# Patient Record
Sex: MALE | Race: WHITE | NOT HISPANIC OR LATINO
[De-identification: names, ages, dates, MRNs, and addresses within clinical notes are randomized per-mention and may not be internally consistent; named-entity substitution may affect disease eponyms.]

---

## 2018-05-23 ENCOUNTER — APPOINTMENT (OUTPATIENT)
Dept: UROLOGY | Facility: CLINIC | Age: 63
End: 2018-05-23
Payer: COMMERCIAL

## 2018-05-23 VITALS
HEIGHT: 75 IN | DIASTOLIC BLOOD PRESSURE: 80 MMHG | SYSTOLIC BLOOD PRESSURE: 117 MMHG | HEART RATE: 69 BPM | RESPIRATION RATE: 16 BRPM | TEMPERATURE: 97 F

## 2018-05-23 DIAGNOSIS — Z00.00 ENCOUNTER FOR GENERAL ADULT MEDICAL EXAMINATION W/OUT ABNORMAL FINDINGS: ICD-10-CM

## 2018-05-23 DIAGNOSIS — R35.0 FREQUENCY OF MICTURITION: ICD-10-CM

## 2018-05-23 LAB
APPEARANCE: CLEAR
BACTERIA: NEGATIVE
BILIRUBIN URINE: NEGATIVE
BLOOD URINE: ABNORMAL
COLOR: YELLOW
GLUCOSE QUALITATIVE U: NEGATIVE MG/DL
HYALINE CASTS: 0 /LPF
KETONES URINE: NEGATIVE
LEUKOCYTE ESTERASE URINE: NEGATIVE
MICROSCOPIC-UA: NORMAL
NITRITE URINE: NEGATIVE
PH URINE: 5.5
PROTEIN URINE: NEGATIVE MG/DL
RED BLOOD CELLS URINE: 4 /HPF
SPECIFIC GRAVITY URINE: 1.02
SQUAMOUS EPITHELIAL CELLS: 0 /HPF
UROBILINOGEN URINE: NEGATIVE MG/DL
WHITE BLOOD CELLS URINE: 3 /HPF

## 2018-05-23 PROCEDURE — 99244 OFF/OP CNSLTJ NEW/EST MOD 40: CPT

## 2018-05-24 LAB
ANION GAP SERPL CALC-SCNC: 19 MMOL/L
BUN SERPL-MCNC: 18 MG/DL
CALCIUM SERPL-MCNC: 9.9 MG/DL
CHLORIDE SERPL-SCNC: 103 MMOL/L
CO2 SERPL-SCNC: 23 MMOL/L
CREAT SERPL-MCNC: 1.01 MG/DL
GLUCOSE SERPL-MCNC: 83 MG/DL
POTASSIUM SERPL-SCNC: 5 MMOL/L
PROLACTIN SERPL-MCNC: 6 NG/ML
PSA FREE FLD-MCNC: 18.9
PSA FREE SERPL-MCNC: 1.29 NG/ML
PSA SERPL-MCNC: 6.84 NG/ML
SODIUM SERPL-SCNC: 145 MMOL/L

## 2018-05-25 ENCOUNTER — APPOINTMENT (OUTPATIENT)
Dept: PULMONOLOGY | Facility: CLINIC | Age: 63
End: 2018-05-25
Payer: COMMERCIAL

## 2018-05-25 VITALS
BODY MASS INDEX: 31.81 KG/M2 | HEIGHT: 73 IN | RESPIRATION RATE: 17 BRPM | HEART RATE: 88 BPM | OXYGEN SATURATION: 96 % | DIASTOLIC BLOOD PRESSURE: 70 MMHG | SYSTOLIC BLOOD PRESSURE: 112 MMHG | WEIGHT: 240 LBS

## 2018-05-25 DIAGNOSIS — R40.0 SOMNOLENCE: ICD-10-CM

## 2018-05-25 DIAGNOSIS — R06.83 SNORING: ICD-10-CM

## 2018-05-25 LAB — CORE LAB FLUID CYTOLOGY: NORMAL

## 2018-05-25 PROCEDURE — 99204 OFFICE O/P NEW MOD 45 MIN: CPT

## 2018-05-26 PROBLEM — R40.0 UNCONTROLLED DAYTIME SOMNOLENCE: Status: ACTIVE | Noted: 2018-05-26

## 2018-05-29 ENCOUNTER — EMERGENCY (EMERGENCY)
Facility: HOSPITAL | Age: 63
LOS: 1 days | Discharge: HOME | End: 2018-05-29
Attending: EMERGENCY MEDICINE
Payer: COMMERCIAL

## 2018-05-29 VITALS
DIASTOLIC BLOOD PRESSURE: 90 MMHG | TEMPERATURE: 98 F | OXYGEN SATURATION: 100 % | RESPIRATION RATE: 18 BRPM | SYSTOLIC BLOOD PRESSURE: 120 MMHG | HEART RATE: 72 BPM

## 2018-05-29 VITALS — SYSTOLIC BLOOD PRESSURE: 170 MMHG | DIASTOLIC BLOOD PRESSURE: 100 MMHG | RESPIRATION RATE: 18 BRPM | HEART RATE: 72 BPM

## 2018-05-29 LAB
ALBUMIN SERPL ELPH-MCNC: 4.3 G/DL — SIGNIFICANT CHANGE UP (ref 3.3–5)
ALP SERPL-CCNC: 55 U/L — SIGNIFICANT CHANGE UP (ref 40–120)
ALT FLD-CCNC: 27 U/L — SIGNIFICANT CHANGE UP (ref 10–45)
ANION GAP SERPL CALC-SCNC: 8 MMOL/L — SIGNIFICANT CHANGE UP (ref 5–17)
APTT BLD: 29 SEC — SIGNIFICANT CHANGE UP (ref 27.5–37.4)
AST SERPL-CCNC: 17 U/L — SIGNIFICANT CHANGE UP (ref 10–40)
BASOPHILS # BLD AUTO: 0 K/UL — SIGNIFICANT CHANGE UP (ref 0–0.2)
BASOPHILS NFR BLD AUTO: 0.1 % — SIGNIFICANT CHANGE UP (ref 0–2)
BILIRUB SERPL-MCNC: 0.3 MG/DL — SIGNIFICANT CHANGE UP (ref 0.2–1.2)
BUN SERPL-MCNC: 19 MG/DL — SIGNIFICANT CHANGE UP (ref 7–23)
CALCIUM SERPL-MCNC: 9.5 MG/DL — SIGNIFICANT CHANGE UP (ref 8.4–10.5)
CHLORIDE SERPL-SCNC: 104 MMOL/L — SIGNIFICANT CHANGE UP (ref 96–108)
CO2 SERPL-SCNC: 30 MMOL/L — SIGNIFICANT CHANGE UP (ref 22–31)
CREAT SERPL-MCNC: 0.96 MG/DL — SIGNIFICANT CHANGE UP (ref 0.5–1.3)
EOSINOPHIL # BLD AUTO: 0 K/UL — SIGNIFICANT CHANGE UP (ref 0–0.5)
EOSINOPHIL NFR BLD AUTO: 0.1 % — SIGNIFICANT CHANGE UP (ref 0–6)
GLUCOSE SERPL-MCNC: 114 MG/DL — HIGH (ref 70–99)
HCT VFR BLD CALC: 48.7 % — SIGNIFICANT CHANGE UP (ref 39–50)
HGB BLD-MCNC: 16.2 G/DL — SIGNIFICANT CHANGE UP (ref 13–17)
INR BLD: 1.12 RATIO — SIGNIFICANT CHANGE UP (ref 0.88–1.16)
LYMPHOCYTES # BLD AUTO: 1.9 K/UL — SIGNIFICANT CHANGE UP (ref 1–3.3)
LYMPHOCYTES # BLD AUTO: 11.5 % — LOW (ref 13–44)
MCHC RBC-ENTMCNC: 30.7 PG — SIGNIFICANT CHANGE UP (ref 27–34)
MCHC RBC-ENTMCNC: 33.3 GM/DL — SIGNIFICANT CHANGE UP (ref 32–36)
MCV RBC AUTO: 92.1 FL — SIGNIFICANT CHANGE UP (ref 80–100)
MONOCYTES # BLD AUTO: 0.6 K/UL — SIGNIFICANT CHANGE UP (ref 0–0.9)
MONOCYTES NFR BLD AUTO: 3.8 % — SIGNIFICANT CHANGE UP (ref 2–14)
NEUTROPHILS # BLD AUTO: 13.9 K/UL — HIGH (ref 1.8–7.4)
NEUTROPHILS NFR BLD AUTO: 84.5 % — HIGH (ref 43–77)
PLATELET # BLD AUTO: 261 K/UL — SIGNIFICANT CHANGE UP (ref 150–400)
POTASSIUM SERPL-MCNC: 4.3 MMOL/L — SIGNIFICANT CHANGE UP (ref 3.5–5.3)
POTASSIUM SERPL-SCNC: 4.3 MMOL/L — SIGNIFICANT CHANGE UP (ref 3.5–5.3)
PROT SERPL-MCNC: 7.3 G/DL — SIGNIFICANT CHANGE UP (ref 6–8.3)
PROTHROM AB SERPL-ACNC: 12.2 SEC — SIGNIFICANT CHANGE UP (ref 9.8–12.7)
RBC # BLD: 5.29 M/UL — SIGNIFICANT CHANGE UP (ref 4.2–5.8)
RBC # FLD: 11.9 % — SIGNIFICANT CHANGE UP (ref 10.3–14.5)
SODIUM SERPL-SCNC: 142 MMOL/L — SIGNIFICANT CHANGE UP (ref 135–145)
TESTOST BND SERPL-MCNC: 5.6 PG/ML
TESTOST SERPL-MCNC: 169 NG/DL
TROPONIN T SERPL-MCNC: <0.01 NG/ML — SIGNIFICANT CHANGE UP (ref 0–0.06)
TROPONIN T SERPL-MCNC: <0.01 NG/ML — SIGNIFICANT CHANGE UP (ref 0–0.06)
WBC # BLD: 16.4 K/UL — HIGH (ref 3.8–10.5)
WBC # FLD AUTO: 16.4 K/UL — HIGH (ref 3.8–10.5)

## 2018-05-29 PROCEDURE — 85730 THROMBOPLASTIN TIME PARTIAL: CPT

## 2018-05-29 PROCEDURE — 80053 COMPREHEN METABOLIC PANEL: CPT

## 2018-05-29 PROCEDURE — 84484 ASSAY OF TROPONIN QUANT: CPT

## 2018-05-29 PROCEDURE — 71046 X-RAY EXAM CHEST 2 VIEWS: CPT

## 2018-05-29 PROCEDURE — 93005 ELECTROCARDIOGRAM TRACING: CPT | Mod: 76

## 2018-05-29 PROCEDURE — 99285 EMERGENCY DEPT VISIT HI MDM: CPT | Mod: 25

## 2018-05-29 PROCEDURE — 85610 PROTHROMBIN TIME: CPT

## 2018-05-29 PROCEDURE — 85027 COMPLETE CBC AUTOMATED: CPT

## 2018-05-29 PROCEDURE — 99284 EMERGENCY DEPT VISIT MOD MDM: CPT | Mod: 25

## 2018-05-29 PROCEDURE — 93010 ELECTROCARDIOGRAM REPORT: CPT

## 2018-05-29 PROCEDURE — 71046 X-RAY EXAM CHEST 2 VIEWS: CPT | Mod: 26

## 2018-05-29 PROCEDURE — 99283 EMERGENCY DEPT VISIT LOW MDM: CPT

## 2018-05-29 RX ORDER — ASPIRIN/CALCIUM CARB/MAGNESIUM 324 MG
162 TABLET ORAL ONCE
Qty: 0 | Refills: 0 | Status: COMPLETED | OUTPATIENT
Start: 2018-05-29 | End: 2018-05-29

## 2018-05-29 NOTE — ED PROVIDER NOTE - ATTENDING CONTRIBUTION TO CARE
61 y/o male with the above documented history and HPI who on exam appears well and comfortable. VSs noted, neck supple, lungs CTA, cardiac sounds s/ audible m/r/g, abdomen soft, NT/ND, extremities s/ asymmetry, calf ttp or palpable cord, skin s/ rash or edema and neurologically intact. EKG s/ acute ischemic change. There is nothing clinically evident to suggest any acute or emergent process; e.g., ACS, Ao catastrophe, PE, PTX, complicated george/myocarditis, boerhaave's etc but given his age and co-morbidities, we have elected to pursue a full investigation, the results of which will likely dictate his ultimate treatment and disposition.

## 2018-05-29 NOTE — ED PROVIDER NOTE - PROGRESS NOTE DETAILS
case discussed with dr maravilla- agrees with ER workup. requesting pt call his office following workup. Attending MD Segovia.  Pt signed out to me in stable condition pending Trop, repeat EKG 1020, call Dr. Rock on discharge, HTN, HLD, recent dx of glioblastoma here with substernal CP/R arm numbness/tingling, no known cards hxShweta is PCP. Attending MD Segovia.  Dr. Fidel Cardenas, pt’s cardiologist in ED to see pt and in agreement with current plan to double troponin/EKG. Attending MD Segovia.  EKG globally higher voltage than previous but morphology unchanged.  Pt remains stable in ED. Attending MD Segovia.  Repeat trop and EKG unchanged.  Pt and family advised to follow-up with Dr. Cardenas.  Pt going straight to his outpt MRI appt.  Stable for discharge.  Return to ED for any acutely new/worsening sxs including new/worse CP/SOB/light-headedness.

## 2018-05-29 NOTE — ED ADULT NURSE REASSESSMENT NOTE - NS ED NURSE REASSESS COMMENT FT1
Rec'd report from Vane Medeiros RN. Pt A/O x3, denies CP at present, NSR @ 56 bpm, family at bedside, awaiting repeat labs at 10:20am.

## 2018-05-29 NOTE — ED ADULT NURSE REASSESSMENT NOTE - NS ED NURSE REASSESS COMMENT FT1
A/O x3, NAD, no further episodes of chest pain, family at bedside. A/O x3, NAD, no further episodes of chest pain, trop #2 sent, family at bedside.

## 2018-05-29 NOTE — ED ADULT NURSE NOTE - OBJECTIVE STATEMENT
62 year old male presented to ED via EMS from home with c/o of chest pain starting at 4am. pt states it is generalized CP and describes as aching. Pt states he does not have cardiac hx. Pt denies current CP, SOB, nausea/vomiting, numbness/tingling, fever, cough, chills, dizziness, headache, blurred vision. Pt a&ox3, lung sounds clear, heart rate regular, abdomen soft nontender nondistended to palp. Skin intact. IV in right AC 20G via EMS and patent. Pt currently resting in bed on cardiac monitor, EKG completed handed to MD - normal sinus. Side rails up for safety, family at bedside. Will continue to monitor and assess while offering support and reassurance.

## 2018-05-29 NOTE — ED PROVIDER NOTE - PHYSICAL EXAMINATION
Aretha Yepez M.D.:   patient awake alert seen lying on stretcher mildly anxious appearing.   LUNGS CTAB no wheeze no crackle.   CARD bradycardic no m/r/g.    Abdomen soft NT ND no rebound no guarding no CVA tenderness.   EXT WWP no edema no calf tenderness CV 2+DP/PT bilaterally.   neuro A&Ox3 gait normal.    skin warm and dry no rash  HEENT: moist mucous membranes, PERRL, EOMI

## 2018-05-29 NOTE — ED PROVIDER NOTE - OBJECTIVE STATEMENT
Aretha Yepez M.D: 62M hx htn hld, recently diagnosed with glioblastoma, p/w sudden onset substernal CP, nonradiating, assoc w/ right arm numbness/tingling. no sob no n/v/c/d. never had a stress test before. no known cardiac history.  took asa 325 prior to arrival    PMD: Shweta

## 2018-05-29 NOTE — ED ADULT NURSE NOTE - CHPI ED SYMPTOMS NEG
no cough/no fever/no diaphoresis/no syncope/no vomiting/no chills/no nausea/no shortness of breath/no dizziness/no back pain/no chest pain

## 2018-05-29 NOTE — ED PROVIDER NOTE - SHIFT CHANGE DETAILS
Awaiting labs and CXR. If his initial set of Glendy are unremarkable, we will send a 2nd set. If his 2nd set are also unremarkable and a repeat EKG reveals no dynamic change, he should be suitable for DC.

## 2018-05-29 NOTE — CONSULT NOTE ADULT - SUBJECTIVE AND OBJECTIVE BOX
Patient is a 62y old  Male who presents with a chief complaint of     HPI:  Patient is 63 yo male HTN, HLD recently diagnosed with R temporal glioblastoma c/o R sided chest pain while in bed this AM described as pressure with numbness of R arm.  Episode lasted 1 hour no associated sob, n,v, palps or lightheadedness.  Currently he is without symptoms. Pt took ASA 325mg this AM  PAST MEDICAL & SURGICAL HISTORY:  Glioblastoma  HLD (hyperlipidemia)  HTN (hypertension)  bilateral knee surgery  vasectomy      Allergies    penicillin (Hives)  sulfa drugs (Hives)    Intolerances    Home Meds  norvasc 5mg daily  Diovan 320mg daily  Crestor 10mg daily  Nexium 40mg daily      MEDICATIONS  (STANDING):    MEDICATIONS  (PRN):      SH  occ cigar,  occ Etoh  FAMILY HISTORY:  No pertinent family history in first degree relatives    ROS  as above other systems neg        Vital Signs Last 24 Hrs  T(C): 36.6 (29 May 2018 06:14), Max: 36.6 (29 May 2018 06:14)  T(F): 97.9 (29 May 2018 06:14), Max: 97.9 (29 May 2018 06:14)  HR: 56 (29 May 2018 07:20) (56 - 72)  BP: 121/76 (29 May 2018 07:20) (121/76 - 170/100)  BP(mean): --  RR: 18 (29 May 2018 07:20) (18 - 18)  SpO2: 96% (29 May 2018 07:20) (96% - 99%)  Daily     Daily   I&O's Detail      Physical Exam  Pt anxious  Neck supple without JVD  Lungs clear  Cor s1s2 without m,r,g  Abd soft   Ext without edema  Pulses +2 DP  LABS  PT/INR - ( 29 May 2018 06:20 )   PT: 12.2 sec;   INR: 1.12 ratio         PTT - ( 29 May 2018 06:20 )  PTT:29.0 sec    CARDIAC MARKERS ( 29 May 2018 06:20 )  x     / <0.01 ng/mL / x     / x     / x          CBC Full  -  ( 29 May 2018 06:20 )  WBC Count : 16.4 K/uL  Hemoglobin : 16.2 g/dL  Hematocrit : 48.7 %  Platelet Count - Automated : 261 K/uL  Mean Cell Volume : 92.1 fl  Mean Cell Hemoglobin : 30.7 pg  Mean Cell Hemoglobin Concentration : 33.3 gm/dL  Auto Neutrophil # : 13.9 K/uL  Auto Lymphocyte # : 1.9 K/uL  Auto Monocyte # : 0.6 K/uL  Auto Eosinophil # : 0.0 K/uL  Auto Basophil # : 0.0 K/uL  Auto Neutrophil % : 84.5 %  Auto Lymphocyte % : 11.5 %  Auto Monocyte % : 3.8 %  Auto Eosinophil % : 0.1 %  Auto Basophil % : 0.1 %    05-29    142  |  104  |  19  ----------------------------<  114<H>  4.3   |  30  |  0.96    Ca    9.5      29 May 2018 06:20    TPro  7.3  /  Alb  4.3  /  TBili  0.3  /  DBili  x   /  AST  17  /  ALT  27  /  AlkPhos  55  05-29    ECG:  SR WNL      RADIOLOGY & ADDITIONAL STUDIES:  CXR CHARLES    Assessment and Plan  Patient is 63 yo male HTN, HLD recently diagnosed with R temporal glioblastoma c/o R sided chest pain while in bed this AM described as pressure with numbness of R arm.  Episode lasted 1 hour no associated sob, n,v, palps or lightheadedness.  Currently he is without symptoms. Pt took ASA 325mg this AM  Chest pain is atypical  2nd troponin pending  If negative recommend nuclear stress test (may be done as an outpatient).

## 2018-05-29 NOTE — ED PROVIDER NOTE - MEDICAL DECISION MAKING DETAILS
62M hx htn hld p/w episode of cp and right arm numbness. ekg NSR; likely component of anxiety but pt with rfs for cardiac disease and so will evaluate for acs with delta troponin.

## 2018-06-08 ENCOUNTER — APPOINTMENT (OUTPATIENT)
Dept: MRI IMAGING | Facility: IMAGING CENTER | Age: 63
End: 2018-06-08

## 2018-07-20 RX ORDER — TAMSULOSIN HYDROCHLORIDE 0.4 MG/1
0.4 CAPSULE ORAL
Qty: 90 | Refills: 3 | Status: ACTIVE | COMMUNITY
Start: 2018-07-20 | End: 1900-01-01

## 2018-08-01 ENCOUNTER — APPOINTMENT (OUTPATIENT)
Dept: UROLOGY | Facility: CLINIC | Age: 63
End: 2018-08-01

## 2018-08-02 ENCOUNTER — INPATIENT (INPATIENT)
Facility: HOSPITAL | Age: 63
LOS: 1 days | Discharge: LEFT BEFORE DISCHARGE | DRG: 54 | End: 2018-08-04
Attending: INTERNAL MEDICINE | Admitting: INTERNAL MEDICINE
Payer: COMMERCIAL

## 2018-08-02 VITALS
RESPIRATION RATE: 15 BRPM | DIASTOLIC BLOOD PRESSURE: 75 MMHG | HEART RATE: 52 BPM | SYSTOLIC BLOOD PRESSURE: 114 MMHG | OXYGEN SATURATION: 100 %

## 2018-08-02 DIAGNOSIS — C71.9 MALIGNANT NEOPLASM OF BRAIN, UNSPECIFIED: ICD-10-CM

## 2018-08-02 DIAGNOSIS — S01.01XA LACERATION WITHOUT FOREIGN BODY OF SCALP, INITIAL ENCOUNTER: ICD-10-CM

## 2018-08-02 DIAGNOSIS — E78.5 HYPERLIPIDEMIA, UNSPECIFIED: ICD-10-CM

## 2018-08-02 DIAGNOSIS — G93.6 CEREBRAL EDEMA: ICD-10-CM

## 2018-08-02 DIAGNOSIS — R55 SYNCOPE AND COLLAPSE: ICD-10-CM

## 2018-08-02 DIAGNOSIS — Z98.890 OTHER SPECIFIED POSTPROCEDURAL STATES: Chronic | ICD-10-CM

## 2018-08-02 DIAGNOSIS — I10 ESSENTIAL (PRIMARY) HYPERTENSION: ICD-10-CM

## 2018-08-02 LAB
ALBUMIN SERPL ELPH-MCNC: 4.2 G/DL — SIGNIFICANT CHANGE UP (ref 3.3–5)
ALP SERPL-CCNC: 41 U/L — SIGNIFICANT CHANGE UP (ref 40–120)
ALT FLD-CCNC: 32 U/L — SIGNIFICANT CHANGE UP (ref 10–45)
ANION GAP SERPL CALC-SCNC: 11 MMOL/L — SIGNIFICANT CHANGE UP (ref 5–17)
APTT BLD: 23.2 SEC — LOW (ref 27.5–37.4)
AST SERPL-CCNC: 23 U/L — SIGNIFICANT CHANGE UP (ref 10–40)
BASOPHILS # BLD AUTO: 0 K/UL — SIGNIFICANT CHANGE UP (ref 0–0.2)
BASOPHILS NFR BLD AUTO: 0.5 % — SIGNIFICANT CHANGE UP (ref 0–2)
BILIRUB SERPL-MCNC: 0.7 MG/DL — SIGNIFICANT CHANGE UP (ref 0.2–1.2)
BUN SERPL-MCNC: 12 MG/DL — SIGNIFICANT CHANGE UP (ref 7–23)
CALCIUM SERPL-MCNC: 9.1 MG/DL — SIGNIFICANT CHANGE UP (ref 8.4–10.5)
CHLORIDE SERPL-SCNC: 105 MMOL/L — SIGNIFICANT CHANGE UP (ref 96–108)
CK SERPL-CCNC: 34 U/L — SIGNIFICANT CHANGE UP (ref 30–200)
CO2 SERPL-SCNC: 22 MMOL/L — SIGNIFICANT CHANGE UP (ref 22–31)
CREAT SERPL-MCNC: 0.97 MG/DL — SIGNIFICANT CHANGE UP (ref 0.5–1.3)
EOSINOPHIL # BLD AUTO: 0.1 K/UL — SIGNIFICANT CHANGE UP (ref 0–0.5)
EOSINOPHIL NFR BLD AUTO: 0.7 % — SIGNIFICANT CHANGE UP (ref 0–6)
GAS PNL BLDV: SIGNIFICANT CHANGE UP
GLUCOSE SERPL-MCNC: 115 MG/DL — HIGH (ref 70–99)
HCT VFR BLD CALC: 41.7 % — SIGNIFICANT CHANGE UP (ref 39–50)
HGB BLD-MCNC: 14.1 G/DL — SIGNIFICANT CHANGE UP (ref 13–17)
INR BLD: 1.12 RATIO — SIGNIFICANT CHANGE UP (ref 0.88–1.16)
LIDOCAIN IGE QN: 22 U/L — SIGNIFICANT CHANGE UP (ref 7–60)
LYMPHOCYTES # BLD AUTO: 0.8 K/UL — LOW (ref 1–3.3)
LYMPHOCYTES # BLD AUTO: 8.6 % — LOW (ref 13–44)
MCHC RBC-ENTMCNC: 31.1 PG — SIGNIFICANT CHANGE UP (ref 27–34)
MCHC RBC-ENTMCNC: 33.8 GM/DL — SIGNIFICANT CHANGE UP (ref 32–36)
MCV RBC AUTO: 92 FL — SIGNIFICANT CHANGE UP (ref 80–100)
MONOCYTES # BLD AUTO: 0.4 K/UL — SIGNIFICANT CHANGE UP (ref 0–0.9)
MONOCYTES NFR BLD AUTO: 4.5 % — SIGNIFICANT CHANGE UP (ref 2–14)
NEUTROPHILS # BLD AUTO: 7.6 K/UL — HIGH (ref 1.8–7.4)
NEUTROPHILS NFR BLD AUTO: 85.7 % — HIGH (ref 43–77)
PLATELET # BLD AUTO: 159 K/UL — SIGNIFICANT CHANGE UP (ref 150–400)
POTASSIUM SERPL-MCNC: 4.3 MMOL/L — SIGNIFICANT CHANGE UP (ref 3.5–5.3)
POTASSIUM SERPL-SCNC: 4.3 MMOL/L — SIGNIFICANT CHANGE UP (ref 3.5–5.3)
PROT SERPL-MCNC: 6.6 G/DL — SIGNIFICANT CHANGE UP (ref 6–8.3)
PROTHROM AB SERPL-ACNC: 12.2 SEC — SIGNIFICANT CHANGE UP (ref 9.8–12.7)
RBC # BLD: 4.54 M/UL — SIGNIFICANT CHANGE UP (ref 4.2–5.8)
RBC # FLD: 12.8 % — SIGNIFICANT CHANGE UP (ref 10.3–14.5)
SODIUM SERPL-SCNC: 138 MMOL/L — SIGNIFICANT CHANGE UP (ref 135–145)
TROPONIN T, HIGH SENSITIVITY RESULT: <6 NG/L — SIGNIFICANT CHANGE UP (ref 0–51)
WBC # BLD: 8.9 K/UL — SIGNIFICANT CHANGE UP (ref 3.8–10.5)
WBC # FLD AUTO: 8.9 K/UL — SIGNIFICANT CHANGE UP (ref 3.8–10.5)

## 2018-08-02 PROCEDURE — 72125 CT NECK SPINE W/O DYE: CPT | Mod: 26

## 2018-08-02 PROCEDURE — 70450 CT HEAD/BRAIN W/O DYE: CPT | Mod: 26

## 2018-08-02 PROCEDURE — 93010 ELECTROCARDIOGRAM REPORT: CPT | Mod: 59

## 2018-08-02 PROCEDURE — 12001 RPR S/N/AX/GEN/TRNK 2.5CM/<: CPT

## 2018-08-02 PROCEDURE — 99285 EMERGENCY DEPT VISIT HI MDM: CPT | Mod: 25

## 2018-08-02 RX ORDER — TEMOZOLOMIDE 140 MG/1
170 CAPSULE ORAL AT BEDTIME
Qty: 0 | Refills: 0 | Status: DISCONTINUED | OUTPATIENT
Start: 2018-08-02 | End: 2018-08-04

## 2018-08-02 RX ORDER — LIDOCAINE 4 G/100G
1 CREAM TOPICAL ONCE
Qty: 0 | Refills: 0 | Status: DISCONTINUED | OUTPATIENT
Start: 2018-08-02 | End: 2018-08-02

## 2018-08-02 RX ORDER — SODIUM CHLORIDE 9 MG/ML
1000 INJECTION INTRAMUSCULAR; INTRAVENOUS; SUBCUTANEOUS ONCE
Qty: 0 | Refills: 0 | Status: COMPLETED | OUTPATIENT
Start: 2018-08-02 | End: 2018-08-02

## 2018-08-02 RX ORDER — DOCUSATE SODIUM 100 MG
300 CAPSULE ORAL DAILY
Qty: 0 | Refills: 0 | Status: DISCONTINUED | OUTPATIENT
Start: 2018-08-02 | End: 2018-08-04

## 2018-08-02 RX ORDER — IRBESARTAN 75 MG/1
150 TABLET ORAL DAILY
Qty: 0 | Refills: 0 | Status: DISCONTINUED | OUTPATIENT
Start: 2018-08-02 | End: 2018-08-04

## 2018-08-02 RX ORDER — DEXAMETHASONE 0.5 MG/5ML
10 ELIXIR ORAL ONCE
Qty: 0 | Refills: 0 | Status: DISCONTINUED | OUTPATIENT
Start: 2018-08-02 | End: 2018-08-02

## 2018-08-02 RX ORDER — LEVETIRACETAM 250 MG/1
500 TABLET, FILM COATED ORAL ONCE
Qty: 0 | Refills: 0 | Status: COMPLETED | OUTPATIENT
Start: 2018-08-02 | End: 2018-08-02

## 2018-08-02 RX ORDER — LEVETIRACETAM 250 MG/1
1000 TABLET, FILM COATED ORAL
Qty: 0 | Refills: 0 | Status: DISCONTINUED | OUTPATIENT
Start: 2018-08-02 | End: 2018-08-03

## 2018-08-02 RX ORDER — TEMOZOLOMIDE 140 MG/1
140 CAPSULE ORAL AT BEDTIME
Qty: 0 | Refills: 0 | Status: DISCONTINUED | OUTPATIENT
Start: 2018-08-02 | End: 2018-08-02

## 2018-08-02 RX ORDER — ATORVASTATIN CALCIUM 80 MG/1
40 TABLET, FILM COATED ORAL AT BEDTIME
Qty: 0 | Refills: 0 | Status: DISCONTINUED | OUTPATIENT
Start: 2018-08-02 | End: 2018-08-04

## 2018-08-02 RX ORDER — METOCLOPRAMIDE HCL 10 MG
10 TABLET ORAL ONCE
Qty: 0 | Refills: 0 | Status: COMPLETED | OUTPATIENT
Start: 2018-08-02 | End: 2018-08-02

## 2018-08-02 RX ORDER — ACETAMINOPHEN 500 MG
650 TABLET ORAL EVERY 6 HOURS
Qty: 0 | Refills: 0 | Status: DISCONTINUED | OUTPATIENT
Start: 2018-08-02 | End: 2018-08-04

## 2018-08-02 RX ORDER — DEXAMETHASONE 0.5 MG/5ML
4 ELIXIR ORAL ONCE
Qty: 0 | Refills: 0 | Status: COMPLETED | OUTPATIENT
Start: 2018-08-02 | End: 2018-08-02

## 2018-08-02 RX ORDER — FAMOTIDINE 10 MG/ML
20 INJECTION INTRAVENOUS DAILY
Qty: 0 | Refills: 0 | Status: DISCONTINUED | OUTPATIENT
Start: 2018-08-02 | End: 2018-08-04

## 2018-08-02 RX ORDER — DEXAMETHASONE 0.5 MG/5ML
4 ELIXIR ORAL EVERY 6 HOURS
Qty: 0 | Refills: 0 | Status: DISCONTINUED | OUTPATIENT
Start: 2018-08-02 | End: 2018-08-03

## 2018-08-02 RX ORDER — SENNA PLUS 8.6 MG/1
2 TABLET ORAL AT BEDTIME
Qty: 0 | Refills: 0 | Status: DISCONTINUED | OUTPATIENT
Start: 2018-08-02 | End: 2018-08-04

## 2018-08-02 RX ORDER — TEMOZOLOMIDE 140 MG/1
20 CAPSULE ORAL AT BEDTIME
Qty: 0 | Refills: 0 | Status: DISCONTINUED | OUTPATIENT
Start: 2018-08-02 | End: 2018-08-02

## 2018-08-02 RX ORDER — TETANUS TOXOID, REDUCED DIPHTHERIA TOXOID AND ACELLULAR PERTUSSIS VACCINE, ADSORBED 5; 2.5; 8; 8; 2.5 [IU]/.5ML; [IU]/.5ML; UG/.5ML; UG/.5ML; UG/.5ML
0.5 SUSPENSION INTRAMUSCULAR ONCE
Qty: 0 | Refills: 0 | Status: COMPLETED | OUTPATIENT
Start: 2018-08-02 | End: 2018-08-02

## 2018-08-02 RX ORDER — TEMOZOLOMIDE 140 MG/1
10 CAPSULE ORAL AT BEDTIME
Qty: 0 | Refills: 0 | Status: DISCONTINUED | OUTPATIENT
Start: 2018-08-02 | End: 2018-08-02

## 2018-08-02 RX ORDER — TAMSULOSIN HYDROCHLORIDE 0.4 MG/1
0.4 CAPSULE ORAL AT BEDTIME
Qty: 0 | Refills: 0 | Status: DISCONTINUED | OUTPATIENT
Start: 2018-08-02 | End: 2018-08-04

## 2018-08-02 RX ORDER — ONDANSETRON 8 MG/1
4 TABLET, FILM COATED ORAL AT BEDTIME
Qty: 0 | Refills: 0 | Status: DISCONTINUED | OUTPATIENT
Start: 2018-08-02 | End: 2018-08-04

## 2018-08-02 RX ADMIN — SENNA PLUS 2 TABLET(S): 8.6 TABLET ORAL at 22:10

## 2018-08-02 RX ADMIN — ATORVASTATIN CALCIUM 40 MILLIGRAM(S): 80 TABLET, FILM COATED ORAL at 22:10

## 2018-08-02 RX ADMIN — SODIUM CHLORIDE 1000 MILLILITER(S): 9 INJECTION INTRAMUSCULAR; INTRAVENOUS; SUBCUTANEOUS at 12:29

## 2018-08-02 RX ADMIN — Medication 10 MILLIGRAM(S): at 12:23

## 2018-08-02 RX ADMIN — Medication 4 MILLIGRAM(S): at 19:00

## 2018-08-02 RX ADMIN — SODIUM CHLORIDE 1000 MILLILITER(S): 9 INJECTION INTRAMUSCULAR; INTRAVENOUS; SUBCUTANEOUS at 12:23

## 2018-08-02 RX ADMIN — LEVETIRACETAM 500 MILLIGRAM(S): 250 TABLET, FILM COATED ORAL at 19:01

## 2018-08-02 RX ADMIN — TAMSULOSIN HYDROCHLORIDE 0.4 MILLIGRAM(S): 0.4 CAPSULE ORAL at 22:10

## 2018-08-02 RX ADMIN — Medication 650 MILLIGRAM(S): at 21:40

## 2018-08-02 RX ADMIN — Medication 650 MILLIGRAM(S): at 21:01

## 2018-08-02 RX ADMIN — FAMOTIDINE 20 MILLIGRAM(S): 10 INJECTION INTRAVENOUS at 22:10

## 2018-08-02 RX ADMIN — TETANUS TOXOID, REDUCED DIPHTHERIA TOXOID AND ACELLULAR PERTUSSIS VACCINE, ADSORBED 0.5 MILLILITER(S): 5; 2.5; 8; 8; 2.5 SUSPENSION INTRAMUSCULAR at 12:35

## 2018-08-02 RX ADMIN — ONDANSETRON 4 MILLIGRAM(S): 8 TABLET, FILM COATED ORAL at 22:10

## 2018-08-02 NOTE — ED ADULT NURSE REASSESSMENT NOTE - NS ED NURSE REASSESS COMMENT FT1
1900  Report received from Jada LOUIS. Pt resting comfortably in bed. Currently admitted awaiting inpatient telemetry bed placement. VSS NAD. Continuous cardiac monitoring maintained. Safety and comfort measures maintained.     2002  Pt received bed assignment. Pt aware. Report given to RN. VSS. Pt stable for transport. Chart given to charge desk. Will cont to monitor. Off tele order in place.

## 2018-08-02 NOTE — CHART NOTE - NSCHARTNOTEFT_GEN_A_CORE
Okay for patient to receive his home dose of temozolomide this evening for history of glioblastoma multiforme. The patient will need to provide his own medications to the pharmacy as it is not on formulary. Full oncology consultation to follow tomorrow. Okay for patient to receive his home dose (170mg or 75mg/m2) of temozolomide this evening for history of glioblastoma multiforme.  The patient will need to provide his own medications to the pharmacy as it is not on formulary. Full oncology consultation to follow tomorrow.

## 2018-08-02 NOTE — ED PROVIDER NOTE - CARE PLAN
Principal Discharge DX:	Syncope  Secondary Diagnosis:	Laceration of scalp without complication  Secondary Diagnosis:	Fall

## 2018-08-02 NOTE — CONSULT NOTE ADULT - PROBLEM SELECTOR RECOMMENDATION 9
vs. (low suspicion for seizure). However, given intracranial abnormality and possible clinical seizure, patient maybe appropriate for  Keppra 250mg BID w/ outpatient follow up for long term management, pending results of VEEG and neuro re evaluation. If diagnosed w/ seizure upon discharge, not recommended for patient to drive until patient has followed up w/ outpatient neurology for further recommendations.   Plan:   -since admitted, okay for VEEG   -cardiac workup for syncope   -neuro checks  -seizure precautions vs. (low suspicion for seizure). However, given intracranial abnormality and possible clinical seizure, patient maybe appropriate for  Keppra 250mg BID w/ outpatient follow up for long term management, pending results of VEEG and neuro re evaluation. If diagnosed w/ seizure upon discharge, not recommended for patient to drive until patient has followed up w/ outpatient neurology for further recommendations.   Plan:   -since admitted, okay for VEEG   -cardiac workup for syncope   -neuro checks  -seizure precautions  -patient already does not drive due to visual field cut.

## 2018-08-02 NOTE — CONSULT NOTE ADULT - ASSESSMENT
62 yo man who presented to [facility] w/ syncopal event while seated w/ subsequent closed head injury (CTH unremarkable for bleed) s/p laceration repair in ED, preceded by nausea and diaphoresis. ROS otherwise unremarkable for dizziness, lightheadedness, focal weakness, numbness, loss of bowel/bladder. Pertinent hx includes GBM (2017 s/p resection (6/2018) & chemoradiation, bradycardia, hx of vasovagal syncope (however patient notes this event was different to past episode). Patient is followed outpatient by neurosurgery and oncology out of [facility]. Neurology consulted for further evaluation for possible new onset seizure given hx of GBM s/p resection.

## 2018-08-02 NOTE — H&P ADULT - NSHPPHYSICALEXAM_GEN_ALL_CORE
General: WN/WD NAD healed wound obn right side of head  Neurology: A&Ox3, nonfocal, LAZARO x 4  Eyes: PERRLA/ EOMI, Gross vision intact  ENT/Neck: Neck supple, trachea midline, No JVD, Gross hearing intact  Respiratory: CTA B/L, No wheezing, rales, rhonchi  CV: RRR, S1S2, no murmurs, rubs or gallops  Abdominal: Soft, NT, ND +BS,   Extremities: No edema, + peripheral pulses  Skin: No Rashes, Hematoma, Ecchymosis

## 2018-08-02 NOTE — ED PROVIDER NOTE - MEDICAL DECISION MAKING DETAILS
-impression: syncope, fall, laceration. will assess for ICH, knee fx, ACS. update tetanus, repair lac, AC  -initial plan: labs, EKG, UA, CXR, symptomatic treatment w/ analgesics, antiemetics, IVF -reassess, -consider admission

## 2018-08-02 NOTE — ED ADULT NURSE REASSESSMENT NOTE - NS ED NURSE REASSESS COMMENT FT1
Pt status remains unchanged In ED S/P CT head, results showed vasogenic edema with 8mm midline shift. Pt resting comfortably in bed, wife at bedside, awaits MD dispo.

## 2018-08-02 NOTE — H&P ADULT - HISTORY OF PRESENT ILLNESS
64yo m pmh GBM on chemo/radiation,Pt was diagnosed in june of this year. Had tumor resection 2 mo ago,and was started on chemo and radiation. As vivek the Pts wife has a lonk hx of bradycardia that extended his stay at [facility] after his resection. Patient states he has had 1 episode of syncope inthe past that wwas deemed a vasovagel event. Patient states this episode was different. Patient states he was diaphoretic and nauseous prior to the last episode. Patient states this time he was talking on the phone. felt slightly nauseas and got into the shower. Patient states he sat down on a shower seat and then foud himself on the floor. Denies any dizzness , lightheadedness, or diaphoresis. Patient was found on the floor by his son.  Patient was brought to [facility] for further treatment. was found to have a laceration on his head requiring suturing.  Patient seen now resting comfortably AAOx 3

## 2018-08-02 NOTE — ED ADULT NURSE NOTE - OBJECTIVE STATEMENT
63 y.o male PMH glioblastoma S/P removal, HDL ,and HTN presenting to ED for syncopal episode this AM. Pt state "I was light headed in the shower and must have fallen. I was stressed before. " Upon exam, pt. A&Ox3 gross neuro intact, pale, lungs cta bilaterally, no difficulty speaking in complete sentences, s1s2 heart sounds heard, PERRLA, pupils 3mm, 2 cm laceration noted on back of head, not actively bleeding at this time. Pt denies chest pain, sob, ha, n/v/d, abdominal pain, f/c, urinary symptoms, hematuria. Large bore IV placed at bedside. MD at bedside, EKG done and given to MD. Labs drawn & sent. Wife at bedside. 2L NS bolus initiated. Pt placed on cardiac monitor, sinus margie on monitor.

## 2018-08-02 NOTE — CONSULT NOTE ADULT - ATTENDING COMMENTS
pt seen 8/3/18 2PM.      Has h/o recurrent syncope in past 15yrs.  Also at obvious risk for sz.  Pt with event of acute LOC yesterday.  in setting of GBM with edema.  wife states imaging shows stable shift vs prior.  recent RTX.  off LEV and steroids at time of incident.  Now started back on LEV and decadron.  Agree with LEV at 500mg bid.  Decadron 4mg bid.  Exam currently with no signs of herniation.  Pt alert, coherent, fairly good strength as described above.  baseline per wife/pt.  [place] [facility]s [place]c.  Call for further issues or concerns. pt seen 8/3/18 2PM.      Has h/o recurrent syncope in past 15yrs.  Also at obvious risk for sz.  Pt with event of acute LOC yesterday.  in setting of GBM with edema.  wife states imaging shows stable shift vs prior.  recent RTX.  off LEV and steroids at time of incident.  Now started back on LEV and decadron.  Agree with LEV at 500mg bid.  Decadron 4mg bid.  Exam currently with no signs of herniation.  Pt alert, coherent, fairly good strength as described above.  baseline per wife/pt.  [place] f[place] [facility] Childs neuroonc.  would recommend outpt [facility] ([phone])  Call for further issues or concerns.

## 2018-08-02 NOTE — CONSULT NOTE ADULT - SUBJECTIVE AND OBJECTIVE BOX
Neurology Consult    Name: CARMELO DOHERTY    HPI: 64 yo man who presented to [facility] w/ syncopal event while seated w/ subsequent closed head injury (CTH unremarkable for bleed) s/p laceration repair in ED, preceded by nausea and diaphoresis. ROS otherwise unremarkable for dizziness, lightheadedness, focal weakness, numbness, loss of bowel/bladder. Pertinent hx includes GBM (2017 s/p resection (6/2018) & chemoradiation, bradycardia, hx of vasovagal syncope (however patient notes this event was different to past episode). Patient is followed outpatient by neurosurgery and oncology out of [facility]. Neurology consulted for further evaluation for possible new onset seizure given hx of GBM s/p resection.     PMH/PSH:   GBM (2017 s/p resection (6/2018) & chemoradiation   bradycardia   HLD  HTN   hx of vasovagal syncope     MEDICATIONS  (STANDING):  atorvastatin 40 milliGRAM(s) Oral at bedtime  dexamethasone     Tablet 4 milliGRAM(s) Oral every 6 hours  docusate sodium 300 milliGRAM(s) Oral daily  famotidine    Tablet 20 milliGRAM(s) Oral daily  irbesartan 150 milliGRAM(s) Oral daily  levETIRAcetam 1000 milliGRAM(s) Oral two times a day  ondansetron    Tablet 4 milliGRAM(s) Oral at bedtime  senna 2 Tablet(s) Oral at bedtime  tamsulosin 0.4 milliGRAM(s) Oral at bedtime  temozolomide 140 milliGRAM(s) Oral at bedtime  temozolomide 20 milliGRAM(s) Oral at bedtime  temozolomide 10 milliGRAM(s) Oral at bedtime    MEDICATIONS  (PRN):  acetaminophen   Tablet. 650 milliGRAM(s) Oral every 6 hours PRN Moderate Pain (4 - 6)    Allergies  penicillin (Hives)  sulfa drugs (Hives)    Objective:   Vital Signs Last 24 Hrs  T(C): 36.7 (02 Aug 2018 20:55), Max: 36.7 (02 Aug 2018 19:57)  T(F): 98.1 (02 Aug 2018 20:55), Max: 98.1 (02 Aug 2018 20:55)  HR: 58 (02 Aug 2018 20:55) (52 - 78)  BP: 115/72 (02 Aug 2018 20:55) (100/62 - 115/72)  RR: 16 (02 Aug 2018 20:55) (15 - 16)  SpO2: 93% (02 Aug 2018 20:55) (93% - 100%)    General Exam:   General appearance: No acute distress                   Neurological Exam:  Mental Status: AAOx3, fluent speech, follows commands    Cranial Nerves: EOMI, PERRL, V1-V3 intact, facial symmetry intact, no dysarthria, tongue midline, VFF    Motor: 5/5 throughout. No drift x4    Sensation: Intact to LT throughout    Coordination: FTN intact b/l    Reflexes: 1+ bilateral biceps, brachioradialis, patellar and ankle    Gait: normal and stable.      Labs:    08-02    138  |  105  |  12  ----------------------------<  115<H>  4.3   |  22  |  0.97    Ca    9.1      02 Aug 2018 12:11    TPro  6.6  /  Alb  4.2  /  TBili  0.7  /  DBili  x   /  AST  23  /  ALT  32  /  AlkPhos  41  08-02    LIVER FUNCTIONS - ( 02 Aug 2018 12:11 )  Alb: 4.2 g/dL / Pro: 6.6 g/dL / ALK PHOS: 41 U/L / ALT: 32 U/L / AST: 23 U/L / GGT: x           CBC Full  -  ( 02 Aug 2018 12:11 )  WBC Count : 8.9 K/uL  Hemoglobin : 14.1 g/dL  Hematocrit : 41.7 %  Platelet Count - Automated : 159 K/uL  Mean Cell Volume : 92.0 fl  Mean Cell Hemoglobin : 31.1 pg  Mean Cell Hemoglobin Concentration : 33.8 gm/dL  Auto Neutrophil # : 7.6 K/uL  Auto Lymphocyte # : 0.8 K/uL  Auto Monocyte # : 0.4 K/uL  Auto Eosinophil # : 0.1 K/uL  Auto Basophil # : 0.0 K/uL  Auto Neutrophil % : 85.7 %  Auto Lymphocyte % : 8.6 %  Auto Monocyte % : 4.5 %  Auto Eosinophil % : 0.7 %  Auto Basophil % : 0.5 %    Radiology  < from: CT Head No Cont (08.02.18 @ 15:40) >    FINDINGS:    HEAD:    Patient is status post right parietotemporal craniotomy. There is marked   vasogenic edema underlying the craniotomy with effacement of the right   lateral ventricle. Low-attenuation extends across the splenium of the   corpus callosum. There is 8 mm of leftward midline shift.    Basal cisterns are maintained.    There is no acute intracranial hemorrhage.    The visualized paranasal sinuses are clear. The mastoid air cells and   middle ear cavities are clear.    The orbits are unremarkable. The visualized soft tissues of the neck are   unremarkable.      CERVICAL SPINE:    There is no evidence for acute fracture, subluxation, or prevertebral   widening.     The craniocervical junction is unremarkable. There is loss of the normal   cervical cervical lordosis.    Vertebral body height is maintained. Vertebral alignment is maintained.   There is loss of height of the C3-4 and C6-7 intervertebral disc. The   remaining intervertebral disc spaces are preserved.    There is no neuroforaminal narrowing. There is no spinal canal narrowing.   The intraspinal contents are grossly unremarkable.    Visualized portions of the lungs are clear. The surrounding structures of   the neck are unremarkable.    The visualized paranasal sinuses are clear. The mastoid air cells and   middle ear cavities are clear.    IMPRESSION:  Head CT: Patient is status post right parietotemporal craniotomy with   marked vasogenic edema underlying the craniotomy with effacement of the   right lateral ventricle. There is 8 mm of leftward midline shift.   Underlying recurrent or residual neoplasm should be considered. Recommend   further evaluation with a contrast-enhanced MRI examination of the brain.    Correlation with outside imaging, if available, will be useful.    Cervical spine CT: No evidence for acute displaced fracture or traumatic   malalignment.     Multilevel cervical spondylosis.    < end of copied text > Neurology Consult    Name: CARMELO DOHERTY    HPI: 62 yo man who presented to [facility] w/ syncopal event while seated w/ subsequent closed head injury (CTH unremarkable for bleed) s/p laceration repair in ED, preceded by nausea and diaphoresis. ROS otherwise unremarkable for dizziness, lightheadedness, focal weakness, numbness, loss of bowel/bladder. Pertinent hx includes GBM (2017 s/p resection (6/2018) & chemoradiation, bradycardia, hx of vasovagal syncope (however patient notes this event was different to past episode). Patient is followed outpatient by neurosurgery and oncology out of [facility]. Neurology consulted for further evaluation for possible new onset seizure given hx of GBM s/p resection.     PMH/PSH:   GBM (2017 s/p resection (6/2018) & chemoradiation   bradycardia   HLD  HTN   hx of vasovagal syncope     MEDICATIONS  (STANDING):  atorvastatin 40 milliGRAM(s) Oral at bedtime  dexamethasone     Tablet 4 milliGRAM(s) Oral every 6 hours  docusate sodium 300 milliGRAM(s) Oral daily  famotidine    Tablet 20 milliGRAM(s) Oral daily  irbesartan 150 milliGRAM(s) Oral daily  levETIRAcetam 1000 milliGRAM(s) Oral two times a day  ondansetron    Tablet 4 milliGRAM(s) Oral at bedtime  senna 2 Tablet(s) Oral at bedtime  tamsulosin 0.4 milliGRAM(s) Oral at bedtime  temozolomide 140 milliGRAM(s) Oral at bedtime  temozolomide 20 milliGRAM(s) Oral at bedtime  temozolomide 10 milliGRAM(s) Oral at bedtime    MEDICATIONS  (PRN):  acetaminophen   Tablet. 650 milliGRAM(s) Oral every 6 hours PRN Moderate Pain (4 - 6)    Allergies  penicillin (Hives)  sulfa drugs (Hives)    Objective:   Vital Signs Last 24 Hrs  T(C): 36.7 (02 Aug 2018 20:55), Max: 36.7 (02 Aug 2018 19:57)  T(F): 98.1 (02 Aug 2018 20:55), Max: 98.1 (02 Aug 2018 20:55)  HR: 58 (02 Aug 2018 20:55) (52 - 78)  BP: 115/72 (02 Aug 2018 20:55) (100/62 - 115/72)  RR: 16 (02 Aug 2018 20:55) (15 - 16)  SpO2: 93% (02 Aug 2018 20:55) (93% - 100%)    General Exam:   General appearance: No acute distress                   Neurological Exam:  Mental Status: AAOx3, fluent speech names objects, follows commands    Cranial Nerves: EOMI no nystagmus, PERRL, V1-V3 intact, facial symmetry intact, no dysarthria, tongue midline, left upper quadrant field cut     Motor: 4+/5 left deltoid, rest 5/5 throughout. + drift left UE.     Sensation: Intact to LT throughout    Coordination: FTN intact b/l    Reflexes: 1+ bilateral biceps, brachioradialis, patellar and ankle    Gait: not assessed     Labs:    08-02    138  |  105  |  12  ----------------------------<  115<H>  4.3   |  22  |  0.97    Ca    9.1      02 Aug 2018 12:11    TPro  6.6  /  Alb  4.2  /  TBili  0.7  /  DBili  x   /  AST  23  /  ALT  32  /  AlkPhos  41  08-02    LIVER FUNCTIONS - ( 02 Aug 2018 12:11 )  Alb: 4.2 g/dL / Pro: 6.6 g/dL / ALK PHOS: 41 U/L / ALT: 32 U/L / AST: 23 U/L / GGT: x           CBC Full  -  ( 02 Aug 2018 12:11 )  WBC Count : 8.9 K/uL  Hemoglobin : 14.1 g/dL  Hematocrit : 41.7 %  Platelet Count - Automated : 159 K/uL  Mean Cell Volume : 92.0 fl  Mean Cell Hemoglobin : 31.1 pg  Mean Cell Hemoglobin Concentration : 33.8 gm/dL  Auto Neutrophil # : 7.6 K/uL  Auto Lymphocyte # : 0.8 K/uL  Auto Monocyte # : 0.4 K/uL  Auto Eosinophil # : 0.1 K/uL  Auto Basophil # : 0.0 K/uL  Auto Neutrophil % : 85.7 %  Auto Lymphocyte % : 8.6 %  Auto Monocyte % : 4.5 %  Auto Eosinophil % : 0.7 %  Auto Basophil % : 0.5 %    Radiology  < from: CT Head No Cont (08.02.18 @ 15:40) >    FINDINGS:    HEAD:    Patient is status post right parietotemporal craniotomy. There is marked   vasogenic edema underlying the craniotomy with effacement of the right   lateral ventricle. Low-attenuation extends across the splenium of the   corpus callosum. There is 8 mm of leftward midline shift.    Basal cisterns are maintained.    There is no acute intracranial hemorrhage.    The visualized paranasal sinuses are clear. The mastoid air cells and   middle ear cavities are clear.    The orbits are unremarkable. The visualized soft tissues of the neck are   unremarkable.      CERVICAL SPINE:    There is no evidence for acute fracture, subluxation, or prevertebral   widening.     The craniocervical junction is unremarkable. There is loss of the normal   cervical cervical lordosis.    Vertebral body height is maintained. Vertebral alignment is maintained.   There is loss of height of the C3-4 and C6-7 intervertebral disc. The   remaining intervertebral disc spaces are preserved.    There is no neuroforaminal narrowing. There is no spinal canal narrowing.   The intraspinal contents are grossly unremarkable.    Visualized portions of the lungs are clear. The surrounding structures of   the neck are unremarkable.    The visualized paranasal sinuses are clear. The mastoid air cells and   middle ear cavities are clear.    IMPRESSION:  Head CT: Patient is status post right parietotemporal craniotomy with   marked vasogenic edema underlying the craniotomy with effacement of the   right lateral ventricle. There is 8 mm of leftward midline shift.   Underlying recurrent or residual neoplasm should be considered. Recommend   further evaluation with a contrast-enhanced MRI examination of the brain.    Correlation with outside imaging, if available, will be useful.    Cervical spine CT: No evidence for acute displaced fracture or traumatic   malalignment.     Multilevel cervical spondylosis.    < end of copied text >

## 2018-08-02 NOTE — H&P ADULT - NSHPLABSRESULTS_GEN_ALL_CORE
14.1   8.9   )-----------( 159      ( 02 Aug 2018 12:11 )             41.7       08-02    138  |  105  |  12  ----------------------------<  115<H>  4.3   |  22  |  0.97    Ca    9.1      02 Aug 2018 12:11    TPro  6.6  /  Alb  4.2  /  TBili  0.7  /  DBili  x   /  AST  23  /  ALT  32  /  AlkPhos  41  08-02                  PT/INR - ( 02 Aug 2018 12:11 )   PT: 12.2 sec;   INR: 1.12 ratio         PTT - ( 02 Aug 2018 12:11 )  PTT:23.2 sec    Lactate Trend      CARDIAC MARKERS ( 02 Aug 2018 12:11 )  x     / x     / 34 U/L / x     / x            CAPILLARY BLOOD GLUCOSE

## 2018-08-02 NOTE — H&P ADULT - NSHPREVIEWOFSYSTEMS_GEN_ALL_CORE
REVIEW OF SYSTEMS:  CONSTITUTIONAL: No fever, change in weight, or fatigue  HEAD: + headache  EYES: No eye pain, visual disturbances, or discharge  ENT:  No difficulty hearing, tinnitus, vertigo, No sinus or throat pain  NECK: No pain or stiffness  BREASTS: No pain, masses, or nipple discharge  RESPIRATORY: No cough, wheezing, chills or hemoptysis, No shortness of breath at rest or exertional shortness of breath  CARDIOVASCULAR: hx of bradycardis  GASTROINTESTINAL: No abdominal or epigastric pain. No nausea, vomiting, or hematemesis, No diarrhea or constipation. No melena or hematochezia.  GENITOURINARY: No dysuria, frequency, hematuria, or incontinence  SKIN: No itching, burning, rashes, or lesions   LYMPH NODES: No history of enlarged glands  ENDOCRINE: No heat or cold intolerance, No hair loss. No osteoporosis or thyroid disease  MUSCULOSKELETAL: No joint pain or swelling, No muscle, back, or extremity pain  PSYCHIATRIC: No depression, anxiety, mood swings, or difficulty sleeping  HEME/LYMPH: No easy bruising, anticoagulants, bleeding disorder or bleeding gums  ALLERGY AND IMMUNOLOGIC: No hives or eczema  NEUROLOGICAL: No memory loss, loss of strength, numbness, or tremors

## 2018-08-02 NOTE — H&P ADULT - PROBLEM SELECTOR PLAN 2
will continue Patient temodar  Patient will continue RT as an out Patient after DC fro hospital. Patient had one episode of RT left to do

## 2018-08-02 NOTE — ED PROVIDER NOTE - PROGRESS NOTE DETAILS
Attending MD Segovia.  Discussed case with pt's wife and PCP Dr. Nate Rock  who reports that pt's HR has not been low into 40's previously.  Concenr that bradycardia may have contributed to his current presentation.  Dr. Rock reports that he will discuss case with Dr. Steven Hameed and will have a cardiologist eval pt in house.  Wife updated.  Pt pending CT head and staples for lac but planned admission for concern for sxatic bradycardia. Attending MD Garrison: patient received in sign out from Dr. Segovia pending CT head read in light of syncope. CT results notable for significant vasogenic edema and 8mm midline shift, pt neuro intact currently, no previous images in system for comparison. Will discuss with neurosurgery here and attempt to contact outpatient surgeon for details on postoperative radiology. Attending MD Garrison: page put out to Dr. Long (neurosurgery at [facility]), awaiting callback.  Attending MD Garrison: spoke with Dr. Grey (covering for Dr. Mendoza), he states the CT findings I relayed to him are stable from MR pt had in June post-op, specifically the midline shift. He recommends keppra 500 bid if patient is not on it currently. No empiric steroids recommended at this time. dr hansen recs 4mg decadroN & keppra 500mg bid. paged dr rishabh patel twice w/o reply back, will adm to him since dr maravilla said he'll updated dr patel

## 2018-08-02 NOTE — H&P ADULT - PROBLEM SELECTOR PLAN 1
follow on tele for any sign of arythmia  as per Pts wife recently has a cardiac w/u which was negative when he was at [facility]  Patient has a recent hx of bradycardia which may be due to worsening vasogenic edema and midline shift

## 2018-08-02 NOTE — ED PROCEDURE NOTE - ATTENDING CONTRIBUTION TO CARE
Attending MD Garrison: Risks, benefit and alternatives of procedure explained to patient and patient demonstrated verbal understanding and consent.  I was present during the key portions of the procedure.

## 2018-08-02 NOTE — H&P ADULT - ASSESSMENT
64 yo male with a hx of GBM s/p resection with chemo and radiation present to [facility] after a syncopal episode  Patient seen now resting comfortably

## 2018-08-02 NOTE — ED PROVIDER NOTE - OBJECTIVE STATEMENT
62yo m 64yo m pmh brain tumor on chemo/radiation, tumor resection 2 mo ago, htn, vasovagal lightheadedness (never sycnope) bibems for fall & syncope in shower. pt was stressed & nausea, sitting in shower chair, found himself in tub. son called ems after hearing fall. c/o hoang. has laceration 2cm parietooccipital region. ROS negative for: fever, chest pain, SOB, Nausea, vomiting, diarrhea, abdominal pain, dysuria, prodromal cp/palpiations/diaphoresis/sob. unknown tetanus

## 2018-08-02 NOTE — ED PROVIDER NOTE - ATTENDING CONTRIBUTION TO CARE
Attending MD Segovia.  Pt is a 64 yo male with hx of a brain tumor on chemo/radiation now s/p tumor resection ~2 mos ago.  Also hx of htn.  Pt states that he was 'nervous, stressed and nauseas' and awent to the shower and sat on a shower chair and found himself on floor.  Unwitnessed syncope/fall.  Son came and found ghime.  Pt felt 'ligth-headed priro to syncope, nauseas.  Denies CP/diaphoresis previously.  Hx of vasovagal events but no hx of syncope previsiously.  Pt is is quite bradycardic in ED.  Pt is well appearing.  R occiptuial 2 cm lac.  No midline C-spine TTP/stepoffs. Abrasion to L knee. Planned eval for syncope/fall/trauma.  Discussed case with radiation onc re: use of staples on scalp.  Per rad onc physician the placement of staples should not negatively impact pt's ability to have intracranial radiation.  Of note pt's wife reports that he had a non-actionable nuc stress in beginning of June.

## 2018-08-02 NOTE — ED ADULT NURSE NOTE - NSIMPLEMENTINTERV_GEN_ALL_ED
Implemented All Fall Risk Interventions:  [place] to call system. Call bell, personal items and telephone within reach. Instruct patient to call for assistance. Room bathroom lighting operational. Non-slip footwear when patient is off stretcher. Physically safe environment: no spills, clutter or unnecessary equipment. Stretcher in lowest position, wheels locked, appropriate side rails in place. Provide visual cue, wrist band, yellow gown, etc. Monitor gait and stability. Monitor for mental status changes and reorient to person, place, and time. Review medications for side effects contributing to fall risk. Reinforce activity limits and safety measures with patient and family.

## 2018-08-03 ENCOUNTER — TRANSCRIPTION ENCOUNTER (OUTPATIENT)
Age: 63
End: 2018-08-03

## 2018-08-03 LAB
APPEARANCE UR: CLEAR — SIGNIFICANT CHANGE UP
BILIRUB UR-MCNC: NEGATIVE — SIGNIFICANT CHANGE UP
COLOR SPEC: SIGNIFICANT CHANGE UP
COMMENT - URINE: SIGNIFICANT CHANGE UP
DIFF PNL FLD: NEGATIVE — SIGNIFICANT CHANGE UP
GLUCOSE UR QL: NEGATIVE — SIGNIFICANT CHANGE UP
KETONES UR-MCNC: ABNORMAL
LEUKOCYTE ESTERASE UR-ACNC: NEGATIVE — SIGNIFICANT CHANGE UP
NITRITE UR-MCNC: NEGATIVE — SIGNIFICANT CHANGE UP
PH UR: 7.5 — SIGNIFICANT CHANGE UP (ref 5–8)
PROT UR-MCNC: NEGATIVE — SIGNIFICANT CHANGE UP
RBC CASTS # UR COMP ASSIST: SIGNIFICANT CHANGE UP /HPF (ref 0–2)
SP GR SPEC: 1.01 — LOW (ref 1.01–1.02)
UROBILINOGEN FLD QL: NEGATIVE — SIGNIFICANT CHANGE UP
WBC UR QL: SIGNIFICANT CHANGE UP /HPF (ref 0–5)

## 2018-08-03 PROCEDURE — 99254 IP/OBS CNSLTJ NEW/EST MOD 60: CPT | Mod: GC

## 2018-08-03 PROCEDURE — 93306 TTE W/DOPPLER COMPLETE: CPT | Mod: 26

## 2018-08-03 PROCEDURE — 99255 IP/OBS CONSLTJ NEW/EST HI 80: CPT

## 2018-08-03 RX ORDER — DOCUSATE SODIUM 100 MG
100 CAPSULE ORAL DAILY
Qty: 0 | Refills: 0 | Status: DISCONTINUED | OUTPATIENT
Start: 2018-08-03 | End: 2018-08-03

## 2018-08-03 RX ORDER — LEVETIRACETAM 250 MG/1
500 TABLET, FILM COATED ORAL ONCE
Qty: 0 | Refills: 0 | Status: COMPLETED | OUTPATIENT
Start: 2018-08-03 | End: 2018-08-03

## 2018-08-03 RX ORDER — LEVETIRACETAM 250 MG/1
500 TABLET, FILM COATED ORAL
Qty: 0 | Refills: 0 | Status: DISCONTINUED | OUTPATIENT
Start: 2018-08-03 | End: 2018-08-04

## 2018-08-03 RX ORDER — DEXAMETHASONE 0.5 MG/5ML
4 ELIXIR ORAL
Qty: 0 | Refills: 0 | Status: DISCONTINUED | OUTPATIENT
Start: 2018-08-03 | End: 2018-08-04

## 2018-08-03 RX ADMIN — FAMOTIDINE 20 MILLIGRAM(S): 10 INJECTION INTRAVENOUS at 13:11

## 2018-08-03 RX ADMIN — TAMSULOSIN HYDROCHLORIDE 0.4 MILLIGRAM(S): 0.4 CAPSULE ORAL at 21:25

## 2018-08-03 RX ADMIN — Medication 4 MILLIGRAM(S): at 19:00

## 2018-08-03 RX ADMIN — Medication 4 MILLIGRAM(S): at 05:44

## 2018-08-03 RX ADMIN — SENNA PLUS 2 TABLET(S): 8.6 TABLET ORAL at 21:25

## 2018-08-03 RX ADMIN — ONDANSETRON 4 MILLIGRAM(S): 8 TABLET, FILM COATED ORAL at 21:25

## 2018-08-03 RX ADMIN — ATORVASTATIN CALCIUM 40 MILLIGRAM(S): 80 TABLET, FILM COATED ORAL at 21:30

## 2018-08-03 RX ADMIN — LEVETIRACETAM 500 MILLIGRAM(S): 250 TABLET, FILM COATED ORAL at 06:42

## 2018-08-03 RX ADMIN — Medication 300 MILLIGRAM(S): at 05:44

## 2018-08-03 RX ADMIN — TEMOZOLOMIDE 30 MILLIGRAM(S): 140 CAPSULE ORAL at 21:24

## 2018-08-03 RX ADMIN — LEVETIRACETAM 500 MILLIGRAM(S): 250 TABLET, FILM COATED ORAL at 19:00

## 2018-08-03 RX ADMIN — IRBESARTAN 150 MILLIGRAM(S): 75 TABLET ORAL at 05:43

## 2018-08-03 NOTE — CONSULT NOTE ADULT - ATTENDING COMMENTS
Pt's CT imaging noted. Currently undergoing for syncopal event. Had been tolerating WBRT with Temodar: counts on Temodar WNL. He will follow up with his oncologist as an outpatient.

## 2018-08-03 NOTE — PROGRESS NOTE ADULT - SUBJECTIVE AND OBJECTIVE BOX
Patient is a 63y old  Male who presents with a chief complaint of syncope (03 Aug 2018 14:03)      HPI:  Patient resting in bed .  Had episode of passing out while seated in the bathroom  Dr. Rock wishes to have patient seen by EPS prior to discharge. Patient is s/p resection of GBM.      MEDICATIONS  (STANDING):  atorvastatin 40 milliGRAM(s) Oral at bedtime  dexamethasone     Tablet 4 milliGRAM(s) Oral two times a day  docusate sodium 300 milliGRAM(s) Oral daily  famotidine    Tablet 20 milliGRAM(s) Oral daily  irbesartan 150 milliGRAM(s) Oral daily  levETIRAcetam 500 milliGRAM(s) Oral two times a day  ondansetron    Tablet 4 milliGRAM(s) Oral at bedtime  senna 2 Tablet(s) Oral at bedtime  tamsulosin 0.4 milliGRAM(s) Oral at bedtime  temozolomide 170 milliGRAM(s) Oral at bedtime    MEDICATIONS  (PRN):  acetaminophen   Tablet. 650 milliGRAM(s) Oral every 6 hours PRN Moderate Pain (4 - 6)      Allergies    penicillin (Hives)  sulfa drugs (Hives)    Intolerance        VITALS:   T(C): 36.6 (08-03-18 @ 20:51), Max: 36.9 (08-03-18 @ 13:51)  HR: 69 (08-03-18 @ 20:51) (57 - 69)  BP: 115/79 (08-03-18 @ 20:51) (109/64 - 121/74)  RR: 18 (08-03-18 @ 20:51) (17 - 18)  SpO2: 97% (08-03-18 @ 20:51) (95% - 97%)  Wt(kg): --        PHYSICAL EXAM:  GENERAL: NAD, well nourished and conversant  laceratoin o fop or [id number]  HEAD:  laceration on occiput  EYES: EOM, PERRLA, conjunctiva pink and sclera white  ENT: No tonsillar erythema, exudates, or enlargement, moist mucous membranes, good dentition, no lesions  NECK: Supple, No JVD, normal thyroid, carotids with normal upstrokes and no bruits  CHEST/LUNG: Clear to auscultation bilaterally, No rales, rhonchi, wheezing, or rubs  HEART: Regular rate and rhythm, No murmurs, rubs, or gallops  ABDOMEN: Soft, nondistended, no masses, guarding, tenderness or rebound, bowel sounds present  EXTREMITIES:  2+ Peripheral Pulses, No clubbing, cyanosis, or edema. No arthritis of shoulders, elbows, hands, hips, knees, ankles, or feet. No DJD C spine, T spine, or L/S spine  LYMPH: No lymphadenopathy noted  SKIN: No rashes or lesions  NERVOUS SYSTEM:  Alert & Oriented X3, normal cognitive function. Motor Strength 5/5 right upper and right lower.  5/5 left upper and left lower extremities, DTRs 2+ intact and symmetric    LABS:                          14.1   8.9   )-----------( 159      ( 02 Aug 2018 12:11 )             41.7     08-02    138  |  105  |  12  ----------------------------<  115<H>  4.3   |  22  |  0.97    Ca    9.1      02 Aug 2018 12:11    TPro  6.6  /  Alb  4.2  /  TBili  0.7  /  DBili  x   /  AST  23  /  ALT  32  /  AlkPhos  41  08-02    CAPILLARY BLOOD GLUCOSE          RADIOLOGY & ADDITIONAL TESTS:      Consultant(s):    Care Discussed with Consultants/Other Providers [ ] YES  [ ] NO

## 2018-08-03 NOTE — CONSULT NOTE ADULT - ASSESSMENT
62 yo M hx HTN and recently diagnosed GBM (5/2018) s/p resection on 6/4/18 and on currently on Temodar/RT p/w syncopal episode, CT head with vasogenic edema underlying craniotomy with 8mm leftward midline shift.    1. GBM: 64 yo M hx HTN and recently diagnosed GBM (5/2018) s/p resection on 6/4/18 and on currently on Temodar/RT p/w syncopal episode, CT head with vasogenic edema underlying craniotomy with 8mm leftward midline shift.    1. GBM: currently on Temodar and RT  - counts stable, no thrombocytopenia  - c/w Temodar (pt taking 170 mg qhs)  - spoke with pt's Oncologist Dr. Childs at [facility] ([phone]) and informed him of pt's admission. Also discussed CT head findings here of vasogenic edema with effacement of R lateral ventricle with 8mm midline shift. These findings were present on prior imaging in July per Dr. Childs and are not new.   - on decadron and keppra   - syncope vs seizure work up ongoing, EP and Neurology following  - asked pt and wife to obtain records including imaging disks from this hospital stay after discharge so Dr. Childs can have records from here.   - f/u with Dr. Childs and continue chemo/RT as outpatient at [facility]    Please re-consult as needed.    Xavier Segura, PGY-5  Hematology-Oncology Fellow  Pager: [phone] ([facility]), [phone] ([facility])

## 2018-08-03 NOTE — DISCHARGE NOTE ADULT - PATIENT PORTAL LINK FT
You can access the [facility] Patient Portal, offered by [facility], by registering with the following website: [email or web address]

## 2018-08-03 NOTE — PROVIDER CONTACT NOTE (OTHER) - ASSESSMENT
Patient lying in bed. VSS. Wife states medication list was given to doctor so unsure why it's incorrect. Wants to hold off on medications until corrected.

## 2018-08-03 NOTE — CONSULT NOTE ADULT - SUBJECTIVE AND OBJECTIVE BOX
CHIEF COMPLAINT: syncope    HISTORY OF PRESENT ILLNESS:  62y/o male h/o HTN, glioblastoma multiforme s/p resection (~2mos ago) w/ chemo/radiation, known sinus bradycardia & was evaluated at [facility], syncope in 5/2018 which was deemed vasovagal p/w syncope resulting in head trauma. Patient states that he was on phone sitting in bathroom when he began feeling nauseous. He then got up and went into shower to see if that would make him feel better. He states the next thing he remembers is waking up on the floor bleeding from his head. He denies urinary/bowel incontinence, CP, SOB, palps or diaphoresis.  Patient reports that in 5/2018 he was in ER with his daughter who was sick. He was escorting her to get a X-ray when he began feeling nauseous, diaphoretic and lightheaded. He had sat down and then had LOC briefly.  He denies any prior near syncope or syncopal episodes aside from above episodes. He states he has good exercise tolerance and 1 week ago did a 1hour spin class session.  Patient reportedly had a nuclear stress test done in 6/2018 prior to his resection which was reportedly normal.  Telemetry shows SB between 50-90bpm. Patient has been without Sx's here in hospital    Allergies  penicillin (Hives)  sulfa drugs (Hives)  	    MEDICATIONS:  irbesartan 150 milliGRAM(s) Oral daily  tamsulosin 0.4 milliGRAM(s) Oral at bedtime  acetaminophen   Tablet. 650 milliGRAM(s) Oral every 6 hours PRN  levETIRAcetam 500 milliGRAM(s) Oral two times a day  ondansetron    Tablet 4 milliGRAM(s) Oral at bedtime  docusate sodium 300 milliGRAM(s) Oral daily  famotidine    Tablet 20 milliGRAM(s) Oral daily  senna 2 Tablet(s) Oral at bedtime  atorvastatin 40 milliGRAM(s) Oral at bedtime  dexamethasone     Tablet 4 milliGRAM(s) Oral two times a day  temozolomide 170 milliGRAM(s) Oral at bedtime      PAST MEDICAL & SURGICAL HISTORY:  Glioblastoma  HLD (hyperlipidemia)  HTN (hypertension)  Status post resection of meningioma      FAMILY HISTORY:  No pertinent family history in first degree relatives      SOCIAL HISTORY:    No toxic habits      REVIEW OF SYSTEMS:  See HPI. Otherwise, 10 point ROS done and otherwise negative.    PHYSICAL EXAM:  T(C): 36.9 (08-03-18 @ 13:51), Max: 36.9 (08-03-18 @ 13:51)  HR: 63 (08-03-18 @ 13:51) (57 - 78)  BP: 109/64 (08-03-18 @ 13:51) (100/62 - 121/74)  RR: 18 (08-03-18 @ 13:51) (16 - 18)  SpO2: 95% (08-03-18 @ 13:51) (93% - 96%)  Wt(kg): --  I&O's Summary    02 Aug 2018 07:01  -  03 Aug 2018 07:00  --------------------------------------------------------  IN: 0 mL / OUT: 400 mL / NET: -400 mL    03 Aug 2018 07:01  -  03 Aug 2018 16:09  --------------------------------------------------------  IN: 480 mL / OUT: 150 mL / NET: 330 mL        Appearance: Alert. NAD	  HEENT:   +laceration on top of head	  Cardiovascular: +S1S2 RRR no m/g/r  Respiratory: CTA B/L	  Psychiatry: A & O x 3, Mood & affect appropriate  Gastrointestinal:  Soft, NT.ND., + BS	  Skin: No rashes	  Neurologic: Non-focal  Extremities: No edema BLE  Vascular: Peripheral pulses palpable 2+ bilaterally      LABS:	 	    CBC Full  -  ( 02 Aug 2018 12:11 )  WBC Count : 8.9 K/uL  Hemoglobin : 14.1 g/dL  Hematocrit : 41.7 %  Platelet Count - Automated : 159 K/uL  Mean Cell Volume : 92.0 fl  Mean Cell Hemoglobin : 31.1 pg  Mean Cell Hemoglobin Concentration : 33.8 gm/dL  Auto Neutrophil # : 7.6 K/uL  Auto Lymphocyte # : 0.8 K/uL  Auto Monocyte # : 0.4 K/uL  Auto Eosinophil # : 0.1 K/uL  Auto Basophil # : 0.0 K/uL  Auto Neutrophil % : 85.7 %  Auto Lymphocyte % : 8.6 %  Auto Monocyte % : 4.5 %  Auto Eosinophil % : 0.7 %  Auto Basophil % : 0.5 %    08-02    138  |  105  |  12  ----------------------------<  115<H>  4.3   |  22  |  0.97    Ca    9.1      02 Aug 2018 12:11    TPro  6.6  /  Alb  4.2  /  TBili  0.7  /  DBili  x   /  AST  23  /  ALT  32  /  AlkPhos  41  08-02    TELEMETRY: SR between 50-90bpm      ECG:  SB @ 48bpm; QRSd: 98ms; NE: 114ms    CT head:   IMPRESSION:  Head CT: Patient is status post right parietotemporal craniotomy with   marked vasogenic edema underlying the craniotomy with effacement of the   right lateral ventricle. There is 8 mm of leftward midline shift.   Underlying recurrent or residual neoplasm should be considered. Recommend   further evaluation with a contrast-enhanced MRI examination of the brain.    Correlation with outside imaging, if available, will be useful.    Cervical spine CT: No evidence for acute displaced fracture or traumatic   malalignment.     Multilevel cervical spondylosis.      ASSESSMENT/PLAN: 	  62y/o male h/o HTN, glioblastoma multiforme s/p resection (~2mos ago) w/ chemo/radiation, known sinus bradycardia & was evaluated at [facility], syncope in 5/2018 which was deemed vasovagal p/w syncope resulting in head trauma CHIEF COMPLAINT: syncope    HISTORY OF PRESENT ILLNESS:  62y/o male h/o HTN, glioblastoma multiforme s/p resection (~2mos ago) w/ chemo/radiation, known sinus bradycardia & was evaluated at [facility], syncope in 5/2018 which was deemed vasovagal p/w syncope resulting in head trauma. Patient states that he was on phone sitting in bathroom when he began feeling nauseous. He then got up and went into shower to see if that would make him feel better. He states the next thing he remembers is waking up on the floor bleeding from his head. He denies urinary/bowel incontinence, CP, SOB, palps or diaphoresis.  Patient reports that in 5/2018 he was in ER with his daughter who was sick. He was escorting her to get a X-ray when he began feeling nauseous, diaphoretic and lightheaded. He had sat down and then had LOC briefly.  He denies any prior near syncope or syncopal episodes aside from above episodes. He states he has good exercise tolerance and 1 week ago did a 1hour spin class session.  Patient reportedly had a nuclear stress test done in 6/2018 prior to his resection which was reportedly normal.  Telemetry shows SB between 50-90bpm. Patient has been without Sx's here in hospital    Allergies  penicillin (Hives)  sulfa drugs (Hives)  	    MEDICATIONS:  irbesartan 150 milliGRAM(s) Oral daily  tamsulosin 0.4 milliGRAM(s) Oral at bedtime  acetaminophen   Tablet. 650 milliGRAM(s) Oral every 6 hours PRN  levETIRAcetam 500 milliGRAM(s) Oral two times a day  ondansetron    Tablet 4 milliGRAM(s) Oral at bedtime  docusate sodium 300 milliGRAM(s) Oral daily  famotidine    Tablet 20 milliGRAM(s) Oral daily  senna 2 Tablet(s) Oral at bedtime  atorvastatin 40 milliGRAM(s) Oral at bedtime  dexamethasone     Tablet 4 milliGRAM(s) Oral two times a day  temozolomide 170 milliGRAM(s) Oral at bedtime      PAST MEDICAL & SURGICAL HISTORY:  Glioblastoma  HLD (hyperlipidemia)  HTN (hypertension)  Status post resection of meningioma      FAMILY HISTORY:  No pertinent family history in first degree relatives      SOCIAL HISTORY:    No toxic habits      REVIEW OF SYSTEMS:  See HPI. Otherwise, 10 point ROS done and otherwise negative.    PHYSICAL EXAM:  T(C): 36.9 (08-03-18 @ 13:51), Max: 36.9 (08-03-18 @ 13:51)  HR: 63 (08-03-18 @ 13:51) (57 - 78)  BP: 109/64 (08-03-18 @ 13:51) (100/62 - 121/74)  RR: 18 (08-03-18 @ 13:51) (16 - 18)  SpO2: 95% (08-03-18 @ 13:51) (93% - 96%)  Wt(kg): --  I&O's Summary    02 Aug 2018 07:01  -  03 Aug 2018 07:00  --------------------------------------------------------  IN: 0 mL / OUT: 400 mL / NET: -400 mL    03 Aug 2018 07:01  -  03 Aug 2018 16:09  --------------------------------------------------------  IN: 480 mL / OUT: 150 mL / NET: 330 mL        Appearance: Alert. NAD	  HEENT:   +laceration on top of head	  Cardiovascular: +S1S2 RRR no m/g/r  Respiratory: CTA B/L	  Psychiatry: A & O x 3, Mood & affect appropriate  Gastrointestinal:  Soft, NT.ND., + BS	  Skin: No rashes	  Neurologic: Non-focal  Extremities: No edema BLE  Vascular: Peripheral pulses palpable 2+ bilaterally      LABS:	 	    CBC Full  -  ( 02 Aug 2018 12:11 )  WBC Count : 8.9 K/uL  Hemoglobin : 14.1 g/dL  Hematocrit : 41.7 %  Platelet Count - Automated : 159 K/uL  Mean Cell Volume : 92.0 fl  Mean Cell Hemoglobin : 31.1 pg  Mean Cell Hemoglobin Concentration : 33.8 gm/dL  Auto Neutrophil # : 7.6 K/uL  Auto Lymphocyte # : 0.8 K/uL  Auto Monocyte # : 0.4 K/uL  Auto Eosinophil # : 0.1 K/uL  Auto Basophil # : 0.0 K/uL  Auto Neutrophil % : 85.7 %  Auto Lymphocyte % : 8.6 %  Auto Monocyte % : 4.5 %  Auto Eosinophil % : 0.7 %  Auto Basophil % : 0.5 %    08-02    138  |  105  |  12  ----------------------------<  115<H>  4.3   |  22  |  0.97    Ca    9.1      02 Aug 2018 12:11    TPro  6.6  /  Alb  4.2  /  TBili  0.7  /  DBili  x   /  AST  23  /  ALT  32  /  AlkPhos  41  08-02    TELEMETRY: SR between 50-90bpm      ECG:  SB @ 48bpm; QRSd: 98ms; WV: 114ms    CT head:   IMPRESSION:  Head CT: Patient is status post right parietotemporal craniotomy with   marked vasogenic edema underlying the craniotomy with effacement of the   right lateral ventricle. There is 8 mm of leftward midline shift.   Underlying recurrent or residual neoplasm should be considered. Recommend   further evaluation with a contrast-enhanced MRI examination of the brain.    Correlation with outside imaging, if available, will be useful.    Cervical spine CT: No evidence for acute displaced fracture or traumatic   malalignment.     Multilevel cervical spondylosis.      ASSESSMENT/PLAN: 	  62y/o male h/o HTN, glioblastoma multiforme s/p resection (~2mos ago) w/ chemo/radiation, known sinus bradycardia & was evaluated at [facility], syncope in 5/2018 which was deemed vasovagal p/w syncope resulting in head trauma w/ Sx's of nausea preceding event (likely vagal episode).   --Neuro workup in progress. EEG ordered  --For Echo  --Will consider ILR implant pending neuro w/u/EEG results.  --Discussed above with Dr. Colton Suarez, PA-C  [id number] CHIEF COMPLAINT: syncope    HISTORY OF PRESENT ILLNESS:  62y/o male h/o HTN, glioblastoma multiforme s/p resection (~2mos ago) w/ chemo/radiation, known sinus bradycardia & was evaluated at [facility], syncope in 5/2018 which was deemed vasovagal p/w syncope resulting in head trauma. Patient states that he was on phone sitting in bathroom when he began feeling nauseous. He then got up and went into shower to see if that would make him feel better. He states the next thing he remembers is waking up on the floor bleeding from his head. He denies urinary/bowel incontinence, CP, SOB, palps or diaphoresis.  Patient reports that in 5/2018 he was in ER with his daughter who was sick. He was escorting her to get a X-ray when he began feeling nauseous, diaphoretic and lightheaded. He had sat down and then had LOC briefly.  He denies any prior near syncope or syncopal episodes aside from above episodes. He states he has good exercise tolerance and 1 week ago did a 1hour spin class session.  Patient reportedly had a nuclear stress test done in 6/2018 prior to his resection which was reportedly normal.  Telemetry shows SB between 50-90bpm. Patient has been without Sx's here in hospital    Allergies  penicillin (Hives)  sulfa drugs (Hives)  	    MEDICATIONS:  irbesartan 150 milliGRAM(s) Oral daily  tamsulosin 0.4 milliGRAM(s) Oral at bedtime  acetaminophen   Tablet. 650 milliGRAM(s) Oral every 6 hours PRN  levETIRAcetam 500 milliGRAM(s) Oral two times a day  ondansetron    Tablet 4 milliGRAM(s) Oral at bedtime  docusate sodium 300 milliGRAM(s) Oral daily  famotidine    Tablet 20 milliGRAM(s) Oral daily  senna 2 Tablet(s) Oral at bedtime  atorvastatin 40 milliGRAM(s) Oral at bedtime  dexamethasone     Tablet 4 milliGRAM(s) Oral two times a day  temozolomide 170 milliGRAM(s) Oral at bedtime      PAST MEDICAL & SURGICAL HISTORY:  Glioblastoma  HLD (hyperlipidemia)  HTN (hypertension)  Status post resection of meningioma      FAMILY HISTORY:  No pertinent family history in first degree relatives      SOCIAL HISTORY:    No toxic habits      REVIEW OF SYSTEMS:  See HPI. Otherwise, 10 point ROS done and otherwise negative.    PHYSICAL EXAM:  T(C): 36.9 (08-03-18 @ 13:51), Max: 36.9 (08-03-18 @ 13:51)  HR: 63 (08-03-18 @ 13:51) (57 - 78)  BP: 109/64 (08-03-18 @ 13:51) (100/62 - 121/74)  RR: 18 (08-03-18 @ 13:51) (16 - 18)  SpO2: 95% (08-03-18 @ 13:51) (93% - 96%)  Wt(kg): --  I&O's Summary    02 Aug 2018 07:01  -  03 Aug 2018 07:00  --------------------------------------------------------  IN: 0 mL / OUT: 400 mL / NET: -400 mL    03 Aug 2018 07:01  -  03 Aug 2018 16:09  --------------------------------------------------------  IN: 480 mL / OUT: 150 mL / NET: 330 mL        Appearance: Alert. NAD	  HEENT:   +laceration on top of head	  Cardiovascular: +S1S2 RRR no m/g/r  Respiratory: CTA B/L	  Psychiatry: A & O x 3, Mood & affect appropriate  Gastrointestinal:  Soft, NT.ND., + BS	  Skin: No rashes	  Neurologic: Non-focal  Extremities: No edema BLE  Vascular: Peripheral pulses palpable 2+ bilaterally      LABS:	 	    CBC Full  -  ( 02 Aug 2018 12:11 )  WBC Count : 8.9 K/uL  Hemoglobin : 14.1 g/dL  Hematocrit : 41.7 %  Platelet Count - Automated : 159 K/uL  Mean Cell Volume : 92.0 fl  Mean Cell Hemoglobin : 31.1 pg  Mean Cell Hemoglobin Concentration : 33.8 gm/dL  Auto Neutrophil # : 7.6 K/uL  Auto Lymphocyte # : 0.8 K/uL  Auto Monocyte # : 0.4 K/uL  Auto Eosinophil # : 0.1 K/uL  Auto Basophil # : 0.0 K/uL  Auto Neutrophil % : 85.7 %  Auto Lymphocyte % : 8.6 %  Auto Monocyte % : 4.5 %  Auto Eosinophil % : 0.7 %  Auto Basophil % : 0.5 %    08-02    138  |  105  |  12  ----------------------------<  115<H>  4.3   |  22  |  0.97    Ca    9.1      02 Aug 2018 12:11    TPro  6.6  /  Alb  4.2  /  TBili  0.7  /  DBili  x   /  AST  23  /  ALT  32  /  AlkPhos  41  08-02    TELEMETRY: SR between 50-90bpm      ECG:  SB @ 48bpm; QRSd: 98ms; MD: 114ms    CT head:   IMPRESSION:  Head CT: Patient is status post right parietotemporal craniotomy with   marked vasogenic edema underlying the craniotomy with effacement of the   right lateral ventricle. There is 8 mm of leftward midline shift.   Underlying recurrent or residual neoplasm should be considered. Recommend   further evaluation with a contrast-enhanced MRI examination of the brain.    Correlation with outside imaging, if available, will be useful.    Cervical spine CT: No evidence for acute displaced fracture or traumatic   malalignment.     Multilevel cervical spondylosis.      ASSESSMENT/PLAN: 	  62y/o male h/o HTN, glioblastoma multiforme s/p resection (~2mos ago) w/ chemo/radiation, known sinus bradycardia & was evaluated at [facility], syncope in 5/2018 which was deemed vasovagal p/w syncope resulting in head trauma w/ Sx's of nausea preceding event (likely vagal episode).   --Neuro workup in progress. EEG ordered  --For Echo  --Will consider ILR implant pending neuro w/u/EEG results.  --Discussed above with Dr. Colton Suarez, PA-C  [id number]

## 2018-08-03 NOTE — PROVIDER CONTACT NOTE (OTHER) - SITUATION
Patient&wife concerned about medications. Decadron ordered 4 mg q 6 hours, at home patient takes Decadron 4 mg twice a day. Keppra ordered 1000 mg BID, at home patient takes 500 mg BID. NP made aware.

## 2018-08-03 NOTE — DISCHARGE NOTE ADULT - PLAN OF CARE
resolution of symptom Fainting usually is caused by fear, stress, pain, standing too long, over tired, overheated, going to bathroom, or coughing  Blood pressure can drop if you do not drink enough, blood pressure medication, alcohol, bleeding,  Consult with your doctor about driving  Avoid activity or condition that causes your syncope  Lay down with your feet up when you feel like you might faint Low salt diet  Activity as tolerated.  Take all medication as prescribed.  Follow up with your medical doctor for routine blood pressure monitoring at your next visit.  Notify your doctor if you have any of the following symptoms:   Dizziness, Lightheadedness, Blurry vision, Headache, Chest pain, Shortness of breath Continue with your cholesterol medications. Eat a heart healthy diet that is low in saturated fats and salt, and includes whole grains, fruits, vegetables and lean protein; exercise regularly (consult with your physician or cardiologist first); maintain a heart healthy weight; if you smoke - quit (A resource to help you stop smoking is the [facility] – phone number [phone].). Continue to follow with your primary physician or cardiologist.  Seek medical help for dizziness, Lightheadedness, Blurry vision, Headache, Chest pain, Shortness of breath Continue with current regimen

## 2018-08-03 NOTE — DISCHARGE NOTE ADULT - MEDICATION SUMMARY - MEDICATIONS TO TAKE
I will START or STAY ON the medications listed below when I get home from the hospital:    dexamethasone 4 mg oral tablet  -- 1 tab(s) by mouth 2 times a day  -- Indication: For Glioblastoma    temozolomide 5 mg oral capsule  -- 34 cap(s) by mouth once a day (at bedtime)  -- Indication: For Glioblastoma    irbesartan 150 mg oral tablet  -- 1 tab(s) by mouth once a day  -- Indication: For HTN (hypertension)    tamsulosin 0.4 mg oral capsule  -- 1 cap(s) by mouth once a day (at bedtime)  -- Indication: For BPH    levETIRAcetam 500 mg oral tablet  -- 1 tab(s) by mouth 2 times a day  -- Indication: For Glioblastoma    ondansetron 4 mg oral tablet  -- 1 tab(s) by mouth once a day (at bedtime)  -- Indication: For Nausea/ Vomiting    atorvastatin 40 mg oral tablet  -- 1 tab(s) by mouth once a day (at bedtime)  -- Indication: For HLD (hyperlipidemia)    famotidine 20 mg oral tablet  -- 1 tab(s) by mouth once a day  -- Indication: For GI    senna oral tablet  -- 2 tab(s) by mouth once a day (at bedtime)  -- Indication: For constipation    docusate sodium 100 mg oral capsule  -- 3 cap(s) by mouth once a day  -- Indication: For Constipation

## 2018-08-03 NOTE — PROGRESS NOTE ADULT - ASSESSMENT
Patine resting in bed .  Had episode of passing out while seated in the bathroom  Dr. Rock wishes to have patient seen by EPS prior to discharge. Patient is s/p resection of GBM.

## 2018-08-03 NOTE — DISCHARGE NOTE ADULT - CARE PLAN
Principal Discharge DX:	Syncope  Goal:	resolution of symptom  Assessment and plan of treatment:	Fainting usually is caused by fear, stress, pain, standing too long, over tired, overheated, going to bathroom, or coughing  Blood pressure can drop if you do not drink enough, blood pressure medication, alcohol, bleeding,  Consult with your doctor about driving  Avoid activity or condition that causes your syncope  Lay down with your feet up when you feel like you might faint  Secondary Diagnosis:	HTN (hypertension)  Assessment and plan of treatment:	Low salt diet  Activity as tolerated.  Take all medication as prescribed.  Follow up with your medical doctor for routine blood pressure monitoring at your next visit.  Notify your doctor if you have any of the following symptoms:   Dizziness, Lightheadedness, Blurry vision, Headache, Chest pain, Shortness of breath  Secondary Diagnosis:	HLD (hyperlipidemia)  Assessment and plan of treatment:	Continue with your cholesterol medications. Eat a heart healthy diet that is low in saturated fats and salt, and includes whole grains, fruits, vegetables and lean protein; exercise regularly (consult with your physician or cardiologist first); maintain a heart healthy weight; if you smoke - quit (A resource to help you stop smoking is the [facility] – phone number 322-756-5569.). Continue to follow with your primary physician or cardiologist.  Seek medical help for dizziness, Lightheadedness, Blurry vision, Headache, Chest pain, Shortness of breath  Secondary Diagnosis:	Laceration of scalp without complication  Assessment and plan of treatment:	Continue with current regimen

## 2018-08-03 NOTE — DISCHARGE NOTE ADULT - HOSPITAL COURSE
62 yo man who presented to [facility] w/ event LOC while seated w/ subsequent closed head injury (CTH unremarkable for bleed) s/p laceration repair in ED, preceded by nausea and diaphoresis. ROS otherwise unremarkable for dizziness, lightheadedness, focal weakness, numbness, loss of bowel/bladder. Pertinent hx includes GBM (2017 s/p resection (6/2018) & chemoradiation, bradycardia, hx of vasovagal syncope (however patient notes this event was different to past episode). Patient is followed outpatient by neurosurgery and oncology out of [facility]. Neurology consulted for further evaluation for possible new onset seizure given hx of GBM s/p resection.     Has h/o recurrent syncope in past 15yrs.  Also at obvious risk for sz.  Pt with event of acute LOC yesterday.  in setting of GBM with edema.  wife states imaging shows stable shift vs prior.  recent RTX.  off LEV and steroids at time of incident.  Now started back on LEV and decadron.  Agree with LEV at 500mg bid.  Decadron 4mg bid.  Exam currently with no signs of herniation.  Pt alert, coherent, fairly good strength as described above.  baseline per wife/pt.  has close f/u with [facility]. - Dr Childs neuroonc.  would recommend EEG now if possible, then outpt [facility] ([phone]). EEG ordered patient and family refusing to stay for EEG at this time. They will prefer to do it as outpatient. Patient and family want to sign out AMA despite encouragement. Ricks and Benefits discussed. They both verbalized understanding. 64 yo man who presented to [facility] w/ event LOC while seated w/ subsequent closed head injury (CTH unremarkable for bleed) s/p laceration repair in ED, preceded by nausea and diaphoresis. ROS otherwise unremarkable for dizziness, lightheadedness, focal weakness, numbness, loss of bowel/bladder. Pertinent hx includes GBM (2017 s/p resection (6/2018) & chemoradiation, bradycardia, hx of vasovagal syncope (however patient notes this event was different to past episode). Patient is followed outpatient by neurosurgery and oncology out of [facility]. Neurology consulted for further evaluation for possible new onset seizure given hx of GBM s/p resection.     Has h/o recurrent syncope in past 15yrs.  Also at obvious risk for sz.  Pt with event of acute LOC yesterday.  in setting of GBM with edema.  wife states imaging shows stable shift vs prior.  recent RTX.  off LEV and steroids at time of incident.  Now started back on LEV and decadron.  Agree with LEV at 500mg bid.  Decadron 4mg bid.  Exam currently with no signs of herniation.  Pt alert, coherent, fairly good strength as described above.  baseline per wife/pt.  has close f/u with [facility]. - Dr Childs neuroonc.  would recommend EEG now if possible, then outpt [facility] ([phone]). EEG ordered patient and family refusing to stay for EEG at this time. They will prefer to do it as outpatient. Patient and family want to sign out AMA despite encouragement. Consequences of his action explained. They both verbalized understanding. 62 yo man who presented to [facility] w/ event LOC while seated w/ subsequent closed head injury (CTH unremarkable for bleed) s/p laceration repair in ED, preceded by nausea and diaphoresis. ROS otherwise unremarkable for dizziness, lightheadedness, focal weakness, numbness, loss of bowel/bladder. Pertinent hx includes GBM (2017 s/p resection (6/2018) & chemoradiation, bradycardia, hx of vasovagal syncope (however patient notes this event was different to past episode). Patient is followed outpatient by neurosurgery and oncology out of [facility]. Neurology consulted for further evaluation for possible new onset seizure given hx of GBM s/p resection.     Has h/o recurrent syncope in past 15yrs.  Also at obvious risk for sz.  Pt with event of acute LOC yesterday.  in setting of GBM with edema.  wife states imaging shows stable shift vs prior.  recent RTX.  off LEV and steroids at time of incident.  Now started back on LEV and decadron.  Agree with LEV at 500mg bid.  Decadron 4mg bid.  Exam currently with no signs of herniation.  Pt alert, coherent, fairly good strength as described above.  baseline per wife/pt.  has close f/u with [facility] [place]. - Dr Chlids neuroonc.  would recommend EEG now if possible, then outpt [facility] ([phone]). EEG ordered patient and family refusing to stay for EEG at this time. They will prefer to do it as outpatient.

## 2018-08-03 NOTE — CONSULT NOTE ADULT - SUBJECTIVE AND OBJECTIVE BOX
HPI: 64 yo M hx HTN and recently diagnosed GBM (5/2018) s/p resection on 6/4/18 and on currently on Temodar/RT p/w syncopal episode.   Reports being nauseous while he was talking on the phone, then went to the bathroom to shower, he sat down on shower seat and then found himself on the floor. Denies any CP, lightheadedness, or palpitations prior to this episode. Denies any bowel or bladder incontinence, no tongue biting. He was found by his family and brought to the ER. He sustained a laceration on his head which required suturing.    As per pt's wife has a long hx of bradycardia that extended his stay at [facility] after his resection. Patient states he has had 1 episode of syncope in the past that was deemed a vasovagal event. Patient states this episode was different. Patient states he was diaphoretic and nauseous prior to the last episode. He got a CT head and was evaluated by Neurology team.     Prior to his diagnosis of GBM, pt had been feeling very tired/fatigued with increased somnolence. He denied any headaches or visual changes, no focal weakness, no history of seizures. He had imaging which showed a brain mass c/w GBM and underwent resection on 6/4/18. He follows with Dr. Sean Childs at [facility] ([phone]).     PAST MEDICAL & SURGICAL HISTORY:  Glioblastoma  HLD (hyperlipidemia)  HTN (hypertension)  Status post resection of meningioma    Allergies    penicillin (Hives)  sulfa drugs (Hives)    Intolerances    MEDICATIONS  (STANDING):  atorvastatin 40 milliGRAM(s) Oral at bedtime  dexamethasone     Tablet 4 milliGRAM(s) Oral every 6 hours  docusate sodium 300 milliGRAM(s) Oral daily  famotidine    Tablet 20 milliGRAM(s) Oral daily  irbesartan 150 milliGRAM(s) Oral daily  levETIRAcetam 1000 milliGRAM(s) Oral two times a day  ondansetron    Tablet 4 milliGRAM(s) Oral at bedtime  senna 2 Tablet(s) Oral at bedtime  tamsulosin 0.4 milliGRAM(s) Oral at bedtime  temozolomide 170 milliGRAM(s) Oral at bedtime    MEDICATIONS  (PRN):  acetaminophen   Tablet. 650 milliGRAM(s) Oral every 6 hours PRN Moderate Pain (4 - 6)    FAMILY HISTORY:  No pertinent family history in first degree relatives    SOCIAL HISTORY: No EtOH, no tobacco    REVIEW OF SYSTEMS: see HPI  All other review of systems is negative unless indicated above    Height (cm): 185.42 (08-02 @ 20:55)    VITALS:   T(F): 97.9 (08-03-18 @ 04:07), Max: 98.1 (08-02-18 @ 20:55)  HR: 57 (08-03-18 @ 04:07)  BP: 121/74 (08-03-18 @ 04:07)  RR: 17 (08-03-18 @ 04:07)  SpO2: 95% (08-03-18 @ 04:07)  Wt(kg): --    PHYSICAL EXAM:  GENERAL: resting in bed comfortably  HEENT: R parietotemporal area with well healed incision  RESP: CTAB  HEART: RRR, S1/S2  ABDOMEN: +BS, soft, NT, ND  EXTREMITIES: no LE edema  NEUROLOGIC: AAO x 3    LABS:                         14.1   8.9   )-----------( 159      ( 02 Aug 2018 12:11 )             41.7     08-02    138  |  105  |  12  ----------------------------<  115<H>  4.3   |  22  |  0.97    Ca    9.1      02 Aug 2018 12:11    TPro  6.6  /  Alb  4.2  /  TBili  0.7  /  DBili  x   /  AST  23  /  ALT  32  /  AlkPhos  41  08-02  PT/INR - ( 02 Aug 2018 12:11 )   PT: 12.2 sec;   INR: 1.12 ratio    PTT - ( 02 Aug 2018 12:11 )  PTT:23.2 sec    IMAGING:  -  CT Head No Cont (08.02.18 @ 15:40) >  IMPRESSION: Head CT: Patient is status post right parietotemporal craniotomy with marked vasogenic edema underlying the craniotomy with effacement of the right lateral ventricle. There is 8 mm of leftward midline shift. Underlying recurrent or residual neoplasm should be considered. Recommend further evaluation with a contrast-enhanced MRI examination of the brain. Correlation with outside imaging, if available, will be useful.

## 2018-08-03 NOTE — PROVIDER CONTACT NOTE (OTHER) - ACTION/TREATMENT ORDERED:
As per NP, hold Decadron midnight dose and give 6 AM dose. Will order 500 mg Keppra in AM one time dose. Will endorse to day team. Continue to monitor.

## 2018-08-03 NOTE — PROVIDER CONTACT NOTE (OTHER) - SITUATION
Patient and wife state Temozolomide is usually only given on days of radiation for glioblastoma and given right after Zofran to help with nausea side effect. NP made aware.

## 2018-08-04 VITALS
RESPIRATION RATE: 18 BRPM | OXYGEN SATURATION: 96 % | SYSTOLIC BLOOD PRESSURE: 108 MMHG | DIASTOLIC BLOOD PRESSURE: 71 MMHG | TEMPERATURE: 98 F | HEART RATE: 59 BPM

## 2018-08-04 LAB
ANION GAP SERPL CALC-SCNC: 9 MMOL/L — SIGNIFICANT CHANGE UP (ref 5–17)
BUN SERPL-MCNC: 12 MG/DL — SIGNIFICANT CHANGE UP (ref 7–23)
CALCIUM SERPL-MCNC: 9 MG/DL — SIGNIFICANT CHANGE UP (ref 8.4–10.5)
CHLORIDE SERPL-SCNC: 107 MMOL/L — SIGNIFICANT CHANGE UP (ref 96–108)
CO2 SERPL-SCNC: 22 MMOL/L — SIGNIFICANT CHANGE UP (ref 22–31)
CREAT SERPL-MCNC: 0.8 MG/DL — SIGNIFICANT CHANGE UP (ref 0.5–1.3)
CULTURE RESULTS: SIGNIFICANT CHANGE UP
GLUCOSE SERPL-MCNC: 130 MG/DL — HIGH (ref 70–99)
HCT VFR BLD CALC: 39.8 % — SIGNIFICANT CHANGE UP (ref 39–50)
HGB BLD-MCNC: 13.2 G/DL — SIGNIFICANT CHANGE UP (ref 13–17)
MCHC RBC-ENTMCNC: 30.3 PG — SIGNIFICANT CHANGE UP (ref 27–34)
MCHC RBC-ENTMCNC: 33.2 GM/DL — SIGNIFICANT CHANGE UP (ref 32–36)
MCV RBC AUTO: 91.3 FL — SIGNIFICANT CHANGE UP (ref 80–100)
PLATELET # BLD AUTO: 197 K/UL — SIGNIFICANT CHANGE UP (ref 150–400)
POTASSIUM SERPL-MCNC: 4.1 MMOL/L — SIGNIFICANT CHANGE UP (ref 3.5–5.3)
POTASSIUM SERPL-SCNC: 4.1 MMOL/L — SIGNIFICANT CHANGE UP (ref 3.5–5.3)
RBC # BLD: 4.36 M/UL — SIGNIFICANT CHANGE UP (ref 4.2–5.8)
RBC # FLD: 13.7 % — SIGNIFICANT CHANGE UP (ref 10.3–14.5)
SODIUM SERPL-SCNC: 138 MMOL/L — SIGNIFICANT CHANGE UP (ref 135–145)
SPECIMEN SOURCE: SIGNIFICANT CHANGE UP
WBC # BLD: 7.13 K/UL — SIGNIFICANT CHANGE UP (ref 3.8–10.5)
WBC # FLD AUTO: 7.13 K/UL — SIGNIFICANT CHANGE UP (ref 3.8–10.5)

## 2018-08-04 PROCEDURE — 80053 COMPREHEN METABOLIC PANEL: CPT

## 2018-08-04 PROCEDURE — 82330 ASSAY OF CALCIUM: CPT

## 2018-08-04 PROCEDURE — 85027 COMPLETE CBC AUTOMATED: CPT

## 2018-08-04 PROCEDURE — 82947 ASSAY GLUCOSE BLOOD QUANT: CPT

## 2018-08-04 PROCEDURE — 93010 ELECTROCARDIOGRAM REPORT: CPT

## 2018-08-04 PROCEDURE — 84132 ASSAY OF SERUM POTASSIUM: CPT

## 2018-08-04 PROCEDURE — 93880 EXTRACRANIAL BILAT STUDY: CPT

## 2018-08-04 PROCEDURE — 96374 THER/PROPH/DIAG INJ IV PUSH: CPT | Mod: XU

## 2018-08-04 PROCEDURE — 83605 ASSAY OF LACTIC ACID: CPT

## 2018-08-04 PROCEDURE — 85014 HEMATOCRIT: CPT

## 2018-08-04 PROCEDURE — 93005 ELECTROCARDIOGRAM TRACING: CPT

## 2018-08-04 PROCEDURE — 99285 EMERGENCY DEPT VISIT HI MDM: CPT | Mod: 25

## 2018-08-04 PROCEDURE — 82435 ASSAY OF BLOOD CHLORIDE: CPT

## 2018-08-04 PROCEDURE — 99232 SBSQ HOSP IP/OBS MODERATE 35: CPT

## 2018-08-04 PROCEDURE — 81001 URINALYSIS AUTO W/SCOPE: CPT

## 2018-08-04 PROCEDURE — 83690 ASSAY OF LIPASE: CPT

## 2018-08-04 PROCEDURE — 90715 TDAP VACCINE 7 YRS/> IM: CPT

## 2018-08-04 PROCEDURE — 87086 URINE CULTURE/COLONY COUNT: CPT

## 2018-08-04 PROCEDURE — 70450 CT HEAD/BRAIN W/O DYE: CPT

## 2018-08-04 PROCEDURE — 72125 CT NECK SPINE W/O DYE: CPT

## 2018-08-04 PROCEDURE — 80048 BASIC METABOLIC PNL TOTAL CA: CPT

## 2018-08-04 PROCEDURE — 82803 BLOOD GASES ANY COMBINATION: CPT

## 2018-08-04 PROCEDURE — 12001 RPR S/N/AX/GEN/TRNK 2.5CM/<: CPT

## 2018-08-04 PROCEDURE — 82550 ASSAY OF CK (CPK): CPT

## 2018-08-04 PROCEDURE — 93880 EXTRACRANIAL BILAT STUDY: CPT | Mod: 26

## 2018-08-04 PROCEDURE — C8929: CPT

## 2018-08-04 PROCEDURE — 85610 PROTHROMBIN TIME: CPT

## 2018-08-04 PROCEDURE — 84295 ASSAY OF SERUM SODIUM: CPT

## 2018-08-04 PROCEDURE — 84484 ASSAY OF TROPONIN QUANT: CPT

## 2018-08-04 PROCEDURE — 90471 IMMUNIZATION ADMIN: CPT

## 2018-08-04 PROCEDURE — 85730 THROMBOPLASTIN TIME PARTIAL: CPT

## 2018-08-04 RX ORDER — ONDANSETRON 8 MG/1
1 TABLET, FILM COATED ORAL
Qty: 30 | Refills: 0 | OUTPATIENT
Start: 2018-08-04 | End: 2018-09-02

## 2018-08-04 RX ORDER — SENNA PLUS 8.6 MG/1
2 TABLET ORAL
Qty: 0 | Refills: 0 | COMMUNITY
Start: 2018-08-04

## 2018-08-04 RX ORDER — TAMSULOSIN HYDROCHLORIDE 0.4 MG/1
1 CAPSULE ORAL
Qty: 0 | Refills: 0 | COMMUNITY
Start: 2018-08-04

## 2018-08-04 RX ORDER — ATORVASTATIN CALCIUM 80 MG/1
1 TABLET, FILM COATED ORAL
Qty: 0 | Refills: 0 | COMMUNITY
Start: 2018-08-04

## 2018-08-04 RX ORDER — DOCUSATE SODIUM 100 MG
3 CAPSULE ORAL
Qty: 0 | Refills: 0 | COMMUNITY
Start: 2018-08-04

## 2018-08-04 RX ORDER — IRBESARTAN 75 MG/1
1 TABLET ORAL
Qty: 0 | Refills: 0 | COMMUNITY
Start: 2018-08-04

## 2018-08-04 RX ORDER — TEMOZOLOMIDE 140 MG/1
34 CAPSULE ORAL
Qty: 0 | Refills: 0 | COMMUNITY
Start: 2018-08-04

## 2018-08-04 RX ORDER — DEXAMETHASONE 0.5 MG/5ML
1 ELIXIR ORAL
Qty: 0 | Refills: 0 | COMMUNITY
Start: 2018-08-04

## 2018-08-04 RX ORDER — FAMOTIDINE 10 MG/ML
1 INJECTION INTRAVENOUS
Qty: 0 | Refills: 0 | COMMUNITY
Start: 2018-08-04

## 2018-08-04 RX ORDER — LEVETIRACETAM 250 MG/1
1 TABLET, FILM COATED ORAL
Qty: 0 | Refills: 0 | COMMUNITY
Start: 2018-08-04

## 2018-08-04 RX ADMIN — Medication 4 MILLIGRAM(S): at 06:48

## 2018-08-04 RX ADMIN — LEVETIRACETAM 500 MILLIGRAM(S): 250 TABLET, FILM COATED ORAL at 06:48

## 2018-08-04 RX ADMIN — Medication 300 MILLIGRAM(S): at 06:48

## 2018-08-04 RX ADMIN — IRBESARTAN 150 MILLIGRAM(S): 75 TABLET ORAL at 06:48

## 2018-08-04 NOTE — PROGRESS NOTE ADULT - SUBJECTIVE AND OBJECTIVE BOX
Neurology Consult  CARMELO DOHERTY    s: no further events, no LOC/LH/F/C/HA/weak/numb    MEDICATIONS  (STANDING):  atorvastatin 40 milliGRAM(s) Oral at bedtime  dexamethasone     Tablet 4 milliGRAM(s) Oral two times a day  docusate sodium 300 milliGRAM(s) Oral daily  famotidine    Tablet 20 milliGRAM(s) Oral daily  irbesartan 150 milliGRAM(s) Oral daily  levETIRAcetam 500 milliGRAM(s) Oral two times a day  ondansetron    Tablet 4 milliGRAM(s) Oral at bedtime  senna 2 Tablet(s) Oral at bedtime  tamsulosin 0.4 milliGRAM(s) Oral at bedtime  temozolomide 170 milliGRAM(s) Oral at bedtime    MEDICATIONS  (PRN):  acetaminophen   Tablet. 650 milliGRAM(s) Oral every 6 hours PRN Moderate Pain (4 - 6)    Allergies  penicillin (Hives)  sulfa drugs (Hives)    Vital Signs Last 24 Hrs:  T(C): 36.7 (08-04-18 @ 04:26), Max: 36.9 (08-03-18 @ 13:51)  T(F): 98 (08-04-18 @ 04:26), Max: 98.4 (08-03-18 @ 13:51)  HR: 62 (08-04-18 @ 04:26) (62 - 69)  BP: 116/72 (08-04-18 @ 04:26) (109/64 - 116/72)  RR: 18 (08-04-18 @ 04:26) (18 - 18)  SpO2: 97% (08-04-18 @ 04:26) (95% - 97%)  General Exam:   General appearance: No acute distress                   Neurological Exam:  Mental Status: AAOx3, fluent speech names objects, follows commands    Cranial Nerves: EOMI no nystagmus, PERRL, V1-V3 intact, facial symmetry intact, no dysarthria, tongue midline, left upper quadrant field cut     Motor: 4+/5 left deltoid, rest 5/5 throughout. + drift left UE.     Sensation: Intact to LT throughout    Coordination: FTN intact b/l    Reflexes: 1+ bilateral biceps, brachioradialis, patellar and ankle    Gait: not assessed     Labs:                        13.2  7.13 )-----------( 197     ( 08-04-18 @ 08:26 )             39.8    138  |  107  |  12  ----------------------------<  130<H>    08-04-18 @ 05:26  4.1   |  22  |  0.80    Ca    9.0      08-04-18 @ 05:26    TPro  6.6  /  Alb  4.2  /  TBili  0.7  /  DBili  x   /  AST  23  /  ALT  32  /  AlkPhos  41  08-02-18 @ 12:11    Creatine Kinase, Serum: 34 U/L (08-02-18 @ 12:11)      Radiology  < from: CT Head No Cont (08.02.18 @ 15:40) >    FINDINGS:    HEAD:    Patient is status post right parietotemporal craniotomy. There is marked vasogenic edema underlying the craniotomy with effacement of the right   lateral ventricle. Low-attenuation extends across the splenium of the corpus callosum. There is 8 mm of leftward midline shift.  Basal cisterns are maintained. There is no acute intracranial hemorrhage. The visualized paranasal sinuses are clear. The mastoid air cells and   middle ear cavities are clear. The orbits are unremarkable. The visualized soft tissues of the neck are unremarkable.

## 2018-08-04 NOTE — PROGRESS NOTE ADULT - ASSESSMENT
64 yo man who presented to [facility] w/ event LOC while seated w/ subsequent closed head injury (CTH unremarkable for bleed) s/p laceration repair in ED, preceded by nausea and diaphoresis. ROS otherwise unremarkable for dizziness, lightheadedness, focal weakness, numbness, loss of bowel/bladder. Pertinent hx includes GBM (2017 s/p resection (6/2018) & chemoradiation, bradycardia, hx of vasovagal syncope (however patient notes this event was different to past episode). Patient is followed outpatient by neurosurgery and oncology out of [facility]. Neurology consulted for further evaluation for possible new onset seizure given hx of GBM s/p resection.     Has h/o recurrent syncope in past 15yrs.  Also at obvious risk for sz.  Pt with event of acute LOC yesterday.  in setting of GBM with edema.  wife states imaging shows stable shift vs prior.  recent RTX.  off LEV and steroids at time of incident.  Now started back on LEV and decadron.  Agree with LEV at 500mg bid.  Decadron 4mg bid.  Exam currently with no signs of herniation.  Pt alert, coherent, fairly good strength as described above.  baseline per wife/pt.  has close f/u with [facility] [place]. - Dr Childs neuroonc.  would recommend EEG now if possible, then outpt [facility] ([phone])  Call for further issues or concerns.

## 2018-08-04 NOTE — CHART NOTE - NSCHARTNOTEFT_GEN_A_CORE
Patient refused to do EEG and deferred it as outpatient with DR John's office. Patient wants to  sign out AMA. Consequences of his action explained to him and his wife. They both verbalized understanding. Multiple telephone calls made out to DR. Mckeon with no answer. Patient Left AMA. Patient refused to do EEG and deferred it as outpatient with DR John's office. Patient wants to go home. Spoke with Dr maradiaga, as per him it is okay to discharge patient.  Patient will follow up with Primary Care Physician and Neurology.  Vital Signs Last 24 Hrs  T(C): 36.8 (04 Aug 2018 11:25), Max: 36.8 (04 Aug 2018 11:25)  T(F): 98.2 (04 Aug 2018 11:25), Max: 98.2 (04 Aug 2018 11:25)  HR: 59 (04 Aug 2018 11:25) (59 - 69)  BP: 108/71 (04 Aug 2018 11:25) (108/71 - 116/72)  BP(mean): --  RR: 18 (04 Aug 2018 11:25) (18 - 18)  SpO2: 96% (04 Aug 2018 11:25) (96% - 97%)                          13.2   7.13  )-----------( 197      ( 04 Aug 2018 08:26 )             39.8       08-04    138  |  107  |  12  ----------------------------<  130<H>  4.1   |  22  |  0.80    Ca    9.0      04 Aug 2018 05:26

## 2018-08-04 NOTE — PROGRESS NOTE ADULT - SUBJECTIVE AND OBJECTIVE BOX
I had a lengthy discussion with the patient and his wife. Discharge is pending EEG and the study done when asymptomatic, is likely to be non diagnostic, given the presence of a GBM. If seizure is suspected as the etiology, 24 hr EEG monitoring would be more diagnostic and I had a lengthy discussion with the patient and his wife. Discharge is pending EEG and the study done when asymptomatic, is likely to be non diagnostic, given the presence of a GBM. If seizure is suspected as the etiology, 24 hr EEG monitoring would be more diagnostic. At present, he is taking prophylactic Keppra; raising his seizure threshold and lessening his risk. Etiology of seizure is not established. Long history of vasovagal syncope adds chronicity to the problem. History of change in nausea, change in body positions and syncope during a cold shower is most suggestive of  a vasovagal event. The patient has had no arrythmia on telemetry, no orthostatic hypotension in hospital and is presently fully ambulatory and asymptomatic. He can be safely discharged to home today and have follow up  with Dr Rock to arrange 24 hr EEG and or EPS consultation and follow up. Hemodynamically stable for discharge to home today.  Full instruction provided to patient and wife regarding diagnosis, treatment, discharge medications, diet and follow up care. Medically stable and for discharge to home today as planned. I had a lengthy discussion with the patient and his wife. Discharge is pending EEG and the study done when asymptomatic, is likely to be non diagnostic, given the presence of a GBM. If seizure is suspected as the etiology, 24 hr EEG monitoring would be more diagnostic. At present, he is taking prophylactic Keppra; raising his seizure threshold and lessening his risk. Etiology of syncope is not established. Long history of vasovagal syncope adds chronicity to the problem. History that syncope was preceded by nausea nausea, change in body positions and syncope occurred during a cold shower, is most suggestive of  a vasovagal event. The patient has had no arrythmia on telemetry, no orthostatic hypotension in hospital and is presently fully ambulatory and asymptomatic. He can be safely discharged to home today and have follow up  with Dr Rock to arrange 24 hr EEG and or EPS consultation and follow up. Hemodynamically stable for discharge to home today.  Full instruction provided to patient and wife regarding diagnosis, treatment, discharge medications, diet and follow up care. Medically stable and for discharge to home today as planned.

## 2018-08-10 ENCOUNTER — TRANSCRIPTION ENCOUNTER (OUTPATIENT)
Age: 63
End: 2018-08-10

## 2018-08-23 ENCOUNTER — APPOINTMENT (OUTPATIENT)
Dept: UROLOGY | Facility: CLINIC | Age: 63
End: 2018-08-23
Payer: COMMERCIAL

## 2018-08-23 DIAGNOSIS — R97.20 ELEVATED PROSTATE, SPECIFIC ANTIGEN [PSA]: ICD-10-CM

## 2018-08-23 DIAGNOSIS — N52.9 MALE ERECTILE DYSFUNCTION, UNSPECIFIED: ICD-10-CM

## 2018-08-23 DIAGNOSIS — N40.1 BENIGN PROSTATIC HYPERPLASIA WITH LOWER URINARY TRACT SYMPMS: ICD-10-CM

## 2018-08-23 DIAGNOSIS — N13.8 BENIGN PROSTATIC HYPERPLASIA WITH LOWER URINARY TRACT SYMPMS: ICD-10-CM

## 2018-08-23 LAB
ALBUMIN SERPL ELPH-MCNC: 4.6 G/DL
ALP BLD-CCNC: 51 U/L
ALT SERPL-CCNC: 39 U/L
ANION GAP SERPL CALC-SCNC: 16 MMOL/L
AST SERPL-CCNC: 16 U/L
BASOPHILS # BLD AUTO: 0.01 K/UL
BASOPHILS NFR BLD AUTO: 0.1 %
BILIRUB SERPL-MCNC: 0.6 MG/DL
BUN SERPL-MCNC: 19 MG/DL
CALCIUM SERPL-MCNC: 9.8 MG/DL
CHLORIDE SERPL-SCNC: 102 MMOL/L
CO2 SERPL-SCNC: 24 MMOL/L
CREAT SERPL-MCNC: 0.87 MG/DL
EOSINOPHIL # BLD AUTO: 0.01 K/UL
EOSINOPHIL NFR BLD AUTO: 0.1 %
GLUCOSE SERPL-MCNC: 97 MG/DL
HCT VFR BLD CALC: 45.6 %
HGB BLD-MCNC: 14.6 G/DL
IMM GRANULOCYTES NFR BLD AUTO: 0.4 %
LYMPHOCYTES # BLD AUTO: 0.73 K/UL
LYMPHOCYTES NFR BLD AUTO: 9.7 %
MAN DIFF?: NORMAL
MCHC RBC-ENTMCNC: 30.8 PG
MCHC RBC-ENTMCNC: 32 GM/DL
MCV RBC AUTO: 96.2 FL
MONOCYTES # BLD AUTO: 0.46 K/UL
MONOCYTES NFR BLD AUTO: 6.1 %
NEUTROPHILS # BLD AUTO: 6.3 K/UL
NEUTROPHILS NFR BLD AUTO: 83.6 %
PLATELET # BLD AUTO: 139 K/UL
POTASSIUM SERPL-SCNC: 4.4 MMOL/L
PROT SERPL-MCNC: 6.5 G/DL
RBC # BLD: 4.74 M/UL
RBC # FLD: 14.8 %
SODIUM SERPL-SCNC: 142 MMOL/L
WBC # FLD AUTO: 7.54 K/UL

## 2018-08-23 PROCEDURE — 99214 OFFICE O/P EST MOD 30 MIN: CPT

## 2018-08-24 LAB
APPEARANCE: CLEAR
BACTERIA: NEGATIVE
BILIRUBIN URINE: NEGATIVE
BLOOD URINE: NEGATIVE
COLOR: YELLOW
GLUCOSE QUALITATIVE U: NEGATIVE MG/DL
HYALINE CASTS: 1 /LPF
KETONES URINE: NEGATIVE
LEUKOCYTE ESTERASE URINE: NEGATIVE
MICROSCOPIC-UA: NORMAL
NITRITE URINE: NEGATIVE
PH URINE: 5.5
PROTEIN URINE: NEGATIVE MG/DL
PSA FREE FLD-MCNC: 19.8
PSA FREE SERPL-MCNC: 0.84 NG/ML
PSA SERPL-MCNC: 4.24 NG/ML
RED BLOOD CELLS URINE: 2 /HPF
SPECIFIC GRAVITY URINE: 1.02
SQUAMOUS EPITHELIAL CELLS: 0 /HPF
UROBILINOGEN URINE: NEGATIVE MG/DL
WHITE BLOOD CELLS URINE: 1 /HPF

## 2018-08-25 LAB — CORE LAB FLUID CYTOLOGY: NORMAL

## 2018-09-03 PROBLEM — R97.20 ELEVATED PROSTATE SPECIFIC ANTIGEN (PSA): Status: ACTIVE | Noted: 2018-05-23

## 2018-10-10 ENCOUNTER — APPOINTMENT (OUTPATIENT)
Dept: OPHTHALMOLOGY | Facility: CLINIC | Age: 63
End: 2018-10-10
Payer: COMMERCIAL

## 2018-10-10 DIAGNOSIS — H53.462 HOMONYMOUS BILATERAL FIELD DEFECTS, LEFT SIDE: ICD-10-CM

## 2018-10-10 PROCEDURE — 92133 CPTRZD OPH DX IMG PST SGM ON: CPT

## 2018-10-10 PROCEDURE — 92015 DETERMINE REFRACTIVE STATE: CPT

## 2018-10-10 PROCEDURE — 92083 EXTENDED VISUAL FIELD XM: CPT

## 2018-10-10 PROCEDURE — 99204 OFFICE O/P NEW MOD 45 MIN: CPT

## 2018-12-27 ENCOUNTER — MEDICATION RENEWAL (OUTPATIENT)
Age: 63
End: 2018-12-27

## 2019-02-03 ENCOUNTER — RX RENEWAL (OUTPATIENT)
Age: 64
End: 2019-02-03

## 2019-02-13 ENCOUNTER — TRANSCRIPTION ENCOUNTER (OUTPATIENT)
Age: 64
End: 2019-02-13

## 2019-02-28 ENCOUNTER — APPOINTMENT (OUTPATIENT)
Dept: UROLOGY | Facility: CLINIC | Age: 64
End: 2019-02-28

## 2019-03-18 ENCOUNTER — RX RENEWAL (OUTPATIENT)
Age: 64
End: 2019-03-18

## 2019-03-18 RX ORDER — TESTOSTERONE 4 MG/D
4 PATCH TRANSDERMAL
Qty: 30 | Refills: 0 | Status: ACTIVE | COMMUNITY
Start: 2018-11-26 | End: 1900-01-01

## 2019-04-08 ENCOUNTER — INPATIENT (INPATIENT)
Facility: HOSPITAL | Age: 64
LOS: 2 days | Discharge: NURSING, REHAB OR LONG-TERM CARE | DRG: 690 | End: 2019-04-11
Attending: INTERNAL MEDICINE | Admitting: INTERNAL MEDICINE
Payer: COMMERCIAL

## 2019-04-08 VITALS
DIASTOLIC BLOOD PRESSURE: 62 MMHG | OXYGEN SATURATION: 97 % | RESPIRATION RATE: 16 BRPM | TEMPERATURE: 97 F | HEART RATE: 70 BPM | WEIGHT: 210.1 LBS | HEIGHT: 73 IN | SYSTOLIC BLOOD PRESSURE: 106 MMHG

## 2019-04-08 DIAGNOSIS — N39.0 URINARY TRACT INFECTION, SITE NOT SPECIFIED: ICD-10-CM

## 2019-04-08 DIAGNOSIS — Z98.890 OTHER SPECIFIED POSTPROCEDURAL STATES: Chronic | ICD-10-CM

## 2019-04-08 DIAGNOSIS — R53.1 WEAKNESS: ICD-10-CM

## 2019-04-08 DIAGNOSIS — R62.7 ADULT FAILURE TO THRIVE: ICD-10-CM

## 2019-04-08 DIAGNOSIS — I10 ESSENTIAL (PRIMARY) HYPERTENSION: ICD-10-CM

## 2019-04-08 DIAGNOSIS — C71.9 MALIGNANT NEOPLASM OF BRAIN, UNSPECIFIED: ICD-10-CM

## 2019-04-08 LAB
ALBUMIN SERPL ELPH-MCNC: 3.6 G/DL — SIGNIFICANT CHANGE UP (ref 3.3–5)
ALP SERPL-CCNC: 53 U/L — SIGNIFICANT CHANGE UP (ref 40–120)
ALT FLD-CCNC: 33 U/L — SIGNIFICANT CHANGE UP (ref 10–45)
ANION GAP SERPL CALC-SCNC: 13 MMOL/L — SIGNIFICANT CHANGE UP (ref 5–17)
APPEARANCE UR: ABNORMAL
AST SERPL-CCNC: 15 U/L — SIGNIFICANT CHANGE UP (ref 10–40)
BACTERIA # UR AUTO: ABNORMAL
BASOPHILS # BLD AUTO: 0 K/UL — SIGNIFICANT CHANGE UP (ref 0–0.2)
BASOPHILS NFR BLD AUTO: 0 % — SIGNIFICANT CHANGE UP (ref 0–2)
BILIRUB SERPL-MCNC: 0.5 MG/DL — SIGNIFICANT CHANGE UP (ref 0.2–1.2)
BILIRUB UR-MCNC: NEGATIVE — SIGNIFICANT CHANGE UP
BUN SERPL-MCNC: 23 MG/DL — SIGNIFICANT CHANGE UP (ref 7–23)
CALCIUM SERPL-MCNC: 9.2 MG/DL — SIGNIFICANT CHANGE UP (ref 8.4–10.5)
CHLORIDE SERPL-SCNC: 107 MMOL/L — SIGNIFICANT CHANGE UP (ref 96–108)
CO2 SERPL-SCNC: 21 MMOL/L — LOW (ref 22–31)
COLOR SPEC: YELLOW — SIGNIFICANT CHANGE UP
CREAT SERPL-MCNC: 0.68 MG/DL — SIGNIFICANT CHANGE UP (ref 0.5–1.3)
DIFF PNL FLD: ABNORMAL
EOSINOPHIL # BLD AUTO: 0 K/UL — SIGNIFICANT CHANGE UP (ref 0–0.5)
EOSINOPHIL NFR BLD AUTO: 0 % — SIGNIFICANT CHANGE UP (ref 0–6)
EPI CELLS # UR: 0 /HPF — SIGNIFICANT CHANGE UP
GLUCOSE SERPL-MCNC: 157 MG/DL — HIGH (ref 70–99)
GLUCOSE UR QL: NEGATIVE — SIGNIFICANT CHANGE UP
HCT VFR BLD CALC: 42.1 % — SIGNIFICANT CHANGE UP (ref 39–50)
HGB BLD-MCNC: 14.6 G/DL — SIGNIFICANT CHANGE UP (ref 13–17)
HYALINE CASTS # UR AUTO: 2 /LPF — SIGNIFICANT CHANGE UP (ref 0–2)
KETONES UR-MCNC: NEGATIVE — SIGNIFICANT CHANGE UP
LEUKOCYTE ESTERASE UR-ACNC: ABNORMAL
LG PLATELETS BLD QL AUTO: SLIGHT — SIGNIFICANT CHANGE UP
LYMPHOCYTES # BLD AUTO: 0.5 K/UL — LOW (ref 1–3.3)
LYMPHOCYTES # BLD AUTO: 9 % — LOW (ref 13–44)
MACROCYTES BLD QL: SLIGHT — SIGNIFICANT CHANGE UP
MCHC RBC-ENTMCNC: 34.6 GM/DL — SIGNIFICANT CHANGE UP (ref 32–36)
MCHC RBC-ENTMCNC: 35.2 PG — HIGH (ref 27–34)
MCV RBC AUTO: 102 FL — HIGH (ref 80–100)
METAMYELOCYTES # FLD: 1 % — HIGH (ref 0–0)
MONOCYTES # BLD AUTO: 0.2 K/UL — SIGNIFICANT CHANGE UP (ref 0–0.9)
MONOCYTES NFR BLD AUTO: 4 % — SIGNIFICANT CHANGE UP (ref 2–14)
NEUTROPHILS # BLD AUTO: 5.5 K/UL — SIGNIFICANT CHANGE UP (ref 1.8–7.4)
NEUTROPHILS NFR BLD AUTO: 86 % — HIGH (ref 43–77)
NITRITE UR-MCNC: NEGATIVE — SIGNIFICANT CHANGE UP
PH UR: 6 — SIGNIFICANT CHANGE UP (ref 5–8)
PLAT MORPH BLD: NORMAL — SIGNIFICANT CHANGE UP
PLATELET # BLD AUTO: 106 K/UL — LOW (ref 150–400)
POTASSIUM SERPL-MCNC: 3.7 MMOL/L — SIGNIFICANT CHANGE UP (ref 3.5–5.3)
POTASSIUM SERPL-SCNC: 3.7 MMOL/L — SIGNIFICANT CHANGE UP (ref 3.5–5.3)
PROT SERPL-MCNC: 5.7 G/DL — LOW (ref 6–8.3)
PROT UR-MCNC: SIGNIFICANT CHANGE UP
RBC # BLD: 4.14 M/UL — LOW (ref 4.2–5.8)
RBC # FLD: 14.2 % — SIGNIFICANT CHANGE UP (ref 10.3–14.5)
RBC BLD AUTO: SIGNIFICANT CHANGE UP
RBC CASTS # UR COMP ASSIST: 2 /HPF — SIGNIFICANT CHANGE UP (ref 0–4)
SODIUM SERPL-SCNC: 141 MMOL/L — SIGNIFICANT CHANGE UP (ref 135–145)
SP GR SPEC: 1.02 — SIGNIFICANT CHANGE UP (ref 1.01–1.02)
UROBILINOGEN FLD QL: NEGATIVE — SIGNIFICANT CHANGE UP
WBC # BLD: 6.2 K/UL — SIGNIFICANT CHANGE UP (ref 3.8–10.5)
WBC # FLD AUTO: 6.2 K/UL — SIGNIFICANT CHANGE UP (ref 3.8–10.5)
WBC UR QL: 22 /HPF — HIGH (ref 0–5)

## 2019-04-08 PROCEDURE — ZZZZZ: CPT

## 2019-04-08 PROCEDURE — 99284 EMERGENCY DEPT VISIT MOD MDM: CPT | Mod: 25

## 2019-04-08 PROCEDURE — 70450 CT HEAD/BRAIN W/O DYE: CPT | Mod: 26

## 2019-04-08 PROCEDURE — 71045 X-RAY EXAM CHEST 1 VIEW: CPT | Mod: 26

## 2019-04-08 PROCEDURE — 93010 ELECTROCARDIOGRAM REPORT: CPT

## 2019-04-08 RX ORDER — LOSARTAN POTASSIUM 100 MG/1
50 TABLET, FILM COATED ORAL DAILY
Qty: 0 | Refills: 0 | Status: DISCONTINUED | OUTPATIENT
Start: 2019-04-08 | End: 2019-04-11

## 2019-04-08 RX ORDER — ESCITALOPRAM OXALATE 10 MG/1
5 TABLET, FILM COATED ORAL DAILY
Qty: 0 | Refills: 0 | Status: DISCONTINUED | OUTPATIENT
Start: 2019-04-08 | End: 2019-04-11

## 2019-04-08 RX ORDER — FLUOXETINE HCL 10 MG
20 CAPSULE ORAL DAILY
Qty: 0 | Refills: 0 | Status: DISCONTINUED | OUTPATIENT
Start: 2019-04-08 | End: 2019-04-11

## 2019-04-08 RX ORDER — DOCUSATE SODIUM 100 MG
100 CAPSULE ORAL DAILY
Qty: 0 | Refills: 0 | Status: DISCONTINUED | OUTPATIENT
Start: 2019-04-08 | End: 2019-04-11

## 2019-04-08 RX ORDER — ONDANSETRON 8 MG/1
4 TABLET, FILM COATED ORAL ONCE
Qty: 0 | Refills: 0 | Status: COMPLETED | OUTPATIENT
Start: 2019-04-08 | End: 2019-04-08

## 2019-04-08 RX ORDER — ATORVASTATIN CALCIUM 80 MG/1
20 TABLET, FILM COATED ORAL AT BEDTIME
Qty: 0 | Refills: 0 | Status: DISCONTINUED | OUTPATIENT
Start: 2019-04-08 | End: 2019-04-11

## 2019-04-08 RX ORDER — FAMOTIDINE 10 MG/ML
20 INJECTION INTRAVENOUS DAILY
Qty: 0 | Refills: 0 | Status: DISCONTINUED | OUTPATIENT
Start: 2019-04-08 | End: 2019-04-11

## 2019-04-08 RX ORDER — MORPHINE SULFATE 50 MG/1
6 CAPSULE, EXTENDED RELEASE ORAL ONCE
Qty: 0 | Refills: 0 | Status: DISCONTINUED | OUTPATIENT
Start: 2019-04-08 | End: 2019-04-08

## 2019-04-08 RX ORDER — CEFTRIAXONE 500 MG/1
1 INJECTION, POWDER, FOR SOLUTION INTRAMUSCULAR; INTRAVENOUS ONCE
Qty: 0 | Refills: 0 | Status: COMPLETED | OUTPATIENT
Start: 2019-04-08 | End: 2019-04-08

## 2019-04-08 RX ORDER — DEXAMETHASONE 0.5 MG/5ML
2 ELIXIR ORAL DAILY
Qty: 0 | Refills: 0 | Status: DISCONTINUED | OUTPATIENT
Start: 2019-04-08 | End: 2019-04-09

## 2019-04-08 RX ORDER — DEXAMETHASONE 0.5 MG/5ML
2.5 ELIXIR ORAL DAILY
Qty: 0 | Refills: 0 | Status: DISCONTINUED | OUTPATIENT
Start: 2019-04-08 | End: 2019-04-11

## 2019-04-08 RX ORDER — SODIUM CHLORIDE 9 MG/ML
1000 INJECTION INTRAMUSCULAR; INTRAVENOUS; SUBCUTANEOUS ONCE
Qty: 0 | Refills: 0 | Status: COMPLETED | OUTPATIENT
Start: 2019-04-08 | End: 2019-04-08

## 2019-04-08 RX ADMIN — SODIUM CHLORIDE 2000 MILLILITER(S): 9 INJECTION INTRAMUSCULAR; INTRAVENOUS; SUBCUTANEOUS at 16:10

## 2019-04-08 RX ADMIN — SODIUM CHLORIDE 1000 MILLILITER(S): 9 INJECTION INTRAMUSCULAR; INTRAVENOUS; SUBCUTANEOUS at 17:10

## 2019-04-08 RX ADMIN — ONDANSETRON 4 MILLIGRAM(S): 8 TABLET, FILM COATED ORAL at 16:10

## 2019-04-08 RX ADMIN — CEFTRIAXONE 100 GRAM(S): 500 INJECTION, POWDER, FOR SOLUTION INTRAMUSCULAR; INTRAVENOUS at 18:12

## 2019-04-08 RX ADMIN — MORPHINE SULFATE 6 MILLIGRAM(S): 50 CAPSULE, EXTENDED RELEASE ORAL at 16:10

## 2019-04-08 NOTE — ED PROVIDER NOTE - SHIFT CHANGE DETAILS
I have received sign out on this patient, briefly: 64 y/o M with h/o GBM s/p resection and chemo/radiation; recently decreased the decadron dose; pending CT head and labs; NS to consult (pending imaging).

## 2019-04-08 NOTE — CONSULT NOTE ADULT - ASSESSMENT
Vasile Bailey  63M pmhx GBM s/p multiple resections and bx for recurrence / radiation necrosis and MSK. Now follows with Dr. Hess at [facility]. Has been tapering steroids and recently started avastin, presents for failure to thrive over several weeks. On exam, AOx2, baseline L side weakness and field cut, full strength on R.  - No acute neurosurgical intervention  - Pt's wife refusing steroids, feels if patient can be discharged to rehab he will improve  - Recommend transfer to [place] for continued care per medicine and neuro oncology  - May reconsult neurosurgery as needed

## 2019-04-08 NOTE — ED ADULT NURSE NOTE - OBJECTIVE STATEMENT
62 y/o M pt w/ pmh of Glioblastoma on chemo and radiation, s/p tumor resection,  HTN, HLD, present to ED for weakness, fatigue and dizziness, symptoms has been for 1 week which has been worsen, pt has been in bed all day s/p last chemo treatment last week, on exam pt A&Ox3, breathing spontaneous, unlabored w/o distress on room air, left side neglect and weakness since resection, left arm swelling, report lower back, dizziness, denies chest pain, SOB, 62 y/o M pt w/ pmh of Glioblastoma on chemo and radiation, s/p tumor resection,  HTN, HLD, present to ED for weakness, fatigue and dizziness, symptoms has been for 1 week which has been worsen, pt has been in bed all day s/p last chemo treatment last week, on exam pt A&Ox3, breathing spontaneous, unlabored w/o distress on room air, left side neglect and weakness since resection, left arm swelling, report lower back, dizziness and nausea, denies chest pain, SOB, fever, dysuria, hematuria, melena, seen and eval by MD, heplock placed, labs drawn and sent

## 2019-04-08 NOTE — H&P ADULT - NSHPLABSRESULTS_GEN_ALL_CORE
14.6   6.2   )-----------( 106      ( 2019 16:20 )             42.1           141  |  107  |  23  ----------------------------<  157<H>  3.7   |  21<L>  |  0.68    Ca    9.2      2019 16:20    TPro  5.7<L>  /  Alb  3.6  /  TBili  0.5  /  DBili  x   /  AST  15  /  ALT  33  /  AlkPhos  53  04-08              Urinalysis Basic - ( 2019 16:20 )    Color: Yellow / Appearance: Slightly Turbid / S.020 / pH: x  Gluc: x / Ketone: Negative  / Bili: Negative / Urobili: Negative   Blood: x / Protein: Trace / Nitrite: Negative   Leuk Esterase: Moderate / RBC: 2 /hpf / WBC 22 /HPF   Sq Epi: x / Non Sq Epi: 0 /hpf / Bacteria: Many            Lactate Trend            CAPILLARY BLOOD GLUCOSE

## 2019-04-08 NOTE — H&P ADULT - NSHPREVIEWOFSYSTEMS_GEN_ALL_CORE
REVIEW OF SYSTEMS:  CONSTITUTIONAL: weight loss  HEAD: + headache and dizziness  EYES: decreased vision  ENT:  No difficulty hearing, tinnitus, vertigo, No sinus or throat pain  NECK: No pain or stiffness  BREASTS: No pain, masses, or nipple discharge  RESPIRATORY: No cough, wheezing, chills or hemoptysis, No shortness of breath at rest or exertional shortness of breath  CARDIOVASCULAR: No chest pain, palpitations, dizziness, CHF, arrhythmia, cardiomegaly or leg swelling  GASTROINTESTINAL: constipation  GENITOURINARY: No dysuria, frequency, hematuria, or incontinence  SKIN: No itching, burning, rashes, or lesions   LYMPH NODES: No history of enlarged glands  ENDOCRINE: No heat or cold intolerance, No hair loss. No osteoporosis or thyroid disease  MUSCULOSKELETAL: + leg pain  PSYCHIATRIC: depressed and confused  HEME/LYMPH: No easy bruising, anticoagulants, bleeding disorder or bleeding gums  ALLERGY AND IMMUNOLOGIC: No hives or eczema  NEUROLOGICAL: + memory loss, loss of strength,

## 2019-04-08 NOTE — H&P ADULT - HISTORY OF PRESENT ILLNESS
62yo m pmh GBM on chemo/radiation, Pt was diagnosed in June of last year.  Had tumor resection and was started on chemo and radiation months ago, is only on Avastatin for chemo currently as a means to wean him off of decadron.  Pt is brought in by his wife for a profound fatigue over the past week.  Pt has not gotten out of bed for a week, no focal complaints of pain just does not feel he can walk and get out of bed.  No fevers, no chills.  No new numbness or weakness (pt has left sided neglect after the brain resection.  Spoke with Dr Hess that recommended he come here to be seen and evaluated.  Nothing makes his stated symptoms better or worse. Patient found to have a Urinary tract infection.

## 2019-04-08 NOTE — ED PROVIDER NOTE - PROGRESS NOTE DETAILS
Attending note (Ezio): called and spoke with neurosurgery resident; service to consult.  UA c/w infection, will start ceftriaxone here. Attending note (Ezio): patient seen by neurosurgery service; rec'd decadron but family/patent refused, does not want to go up on decadron dosing.  neurosurgery rec'd xfer to [place] for continuity of care, patient and family do not want transfer at this time, likely need to admit to medicine for failure to thrive, rehab placement.

## 2019-04-08 NOTE — ED ADULT TRIAGE NOTE - BP NONINVASIVE DIASTOLIC (MM HG)
[facility]    [address] [address]    Phone:  [phone]    Fax:  [phone]       Thank You for choosing us for your health care visit. We are glad to serve you and happy to provide you with this summary of your visit. Please help us to ensure we have accurate records. If you find anything that needs to be changed, please let our staff know as soon as possible.          Your Demographic Information     Patient Name Sex Josef Arriola Male 1973       Ethnic Group Patient Race    Not of  or  Origin White      Your Visit Details     Date & Time Provider Department    2017 8:00 AM Anderson Machuca MD [facility]      Your Upcoming Appointment*(Max 10)       1:30 PM CDT   CT ABD/PELVIS with O CT 1   [facility] ([facility])    [address] [place] [address]   [phone]            2017  1:30 PM CDT   Behavioral Health New Patient with Martha Marr MD   [facility] ([facility])    [address] [place] [id number] [phone]            Wednesday May 31, 2017 12:40 PM CDT   Follow-up Visit with Anderson Machuca MD   [facility] ([facility])    [address] [place] [id number] [phone]              Your To Do List     Future Orders Please Complete On or Around Expires    C REACTIVE PROTEIN      CBC & AUTO DIFFERENTIAL      COMPREHENSIVE METABOLIC PANEL      SEDIMENTATION RATE WESTERGREN      CT ABDOMEN PELVIS      Follow-Up    Return in about 8 weeks (around 3/1/2017) for epigastric pain.      We Ordered or Performed the Following     CASE REQUEST OPERATING ROOM          Conditions Discussed Today or Order-Related Diagnoses        Comments    Epigastric abdominal pain    -  Primary     Midsternal chest pain         Intestinal metaplasia of gastric mucosa           Your Vitals Were     BP Pulse Resp Height Weight BMI    118/70 (BP Location: [facility], Patient Position: Sitting, Cuff Size: Regular) 72 14 5' 9\" (1.753 m) 208 lb (94.3 kg) 30.72 kg/m2    Smoking Status                   Current Some Day Smoker           Medications Prescribed or Re-Ordered Today     None      Your Current Medications Are        Disp Refills Start End    Dexlansoprazole (DEXILANT) 60 MG capsule 90 capsule 3 3/30/2017     Sig - Route: Take 1 capsule by mouth daily. - Oral    Class: Eprescribe    PREDNISONE PO        Sig - Route: Indications: Back Pain - Oral    Class: Historical Med    CYCLOBENZAPRINE HCL PO        Sig - Route: Indications: back pain - Oral    Class: Historical Med    sertraline (ZOLOFT) 25 MG tablet        Sig - Route: Take 25 mg by mouth daily. - Oral    Class: Historical Med    ALPRAZolam (XANAX) 0.25 MG tablet        Sig - Route: Take 0.25 mg by mouth nightly as needed for Sleep. - Oral    Class: Historical Med    EPINEPHrine 0.3 MG/0.3ML injection 1 each 1 1/27/2015     Sig - Route: Inject 0.3 mLs into the muscle once. - Intramuscular    Class: Eprescribe    Eszopiclone 3 MG tablet        Sig - Route: Take 3 mg by mouth nightly as needed. - Oral    Class: Historical Med    DISPENSE        Sig: Venom immunotherapy    Class: Historical Med    DISPENSE        Sig: Allergy immunotherapy    Class: Historical Med    lisinopril (ZESTRIL) 10 MG tablet        Sig - Route: Take 10 mg by mouth daily. - Oral    Class: Historical Med    hydrochlorothiazide (HYDRODIURIL) 25 MG tablet        Sig - Route: Take 25 mg by mouth daily. - Oral    Class: Historical Med      Discontinued Medications        Reason for Discontinue    betamethasone valerate (VALISONE) 0.1 % ointment Therapy Completed     fluticasone (FLONASE) 50 MCG/ACT nasal spray Therapy Completed    levocetirizine (XYZAL) 5 MG tablet Therapy Completed      Allergies     Bee Sting ANAPHYLAXIS      Immunizations History as of 1/4/2017     Name Date    Influenza 11/12/2008, 11/14/2005    Td:Adult type tetanus/diphtheria 1/10/2007    Tdap 6/26/2012      Problem List as of 1/4/2017     Venom and Environmental Immunotherapy (restart 10/2015)    Allergic Rhinitis (trees/grass/weeds/DM/Mold/cockroach) 06/2014    Allergic conjunctivitis of both eyes    Bee sting reaction (large local vs systemic) - skin test 9-12-12 Dr. Salas equivocal to [facility]/    Tinea versicolor (based on history)              Patient Instructions    Chew 2 Gaviscon TABS after each meal and follow with 3-4 sips of water    [phone]  Central scheduling;set up CT Scan of Abdomen/Pelvis;    Draw labs today;    Schedule the upper endoscopy    PROVIDER:  Gastroenterologist, Anderson Machuca MD, KATERIN, FACP, FACG    Procedure:  EGD    DATE:  1/23/2017     TENTATIVE Arrival Time:  11:00 AM    LOCATION:  [facility]      You must have someone to drive you home and a responsible adult (over 18 years old) to stay with you overnight after your procedure.  If you do not have a ride and someone to stay with you overnight at the time of your procedure, the procedure may be delayed or cancelled.  You can not take public transportation home from your procedure.    Note:  Your procedure is scheduled to begin 1 - 1 ½ hours after your arrival time.  This is to complete any necessary paperwork, prepare you for the exam, check your vital signs and meet with anesthesia personnel who will be providing your sedation for the exam.  After the procedure you will go to the post operative recovery area to wake up.  You can plan to be at the hospital for up to 3 hours.  You will be contacted by telephone by the Pre-admissions Department 24 to 48 hours prior to your procedure.  If you are a woman  who is pre-menopausal or menstruating, you will be asked to provide a urine sample PRIOR to your procedure.       CANCELING A PROCEDURE  If it is necessary for you to cancel your procedure please notify our office at least 2 business days in advance.  If you fail to show up for your procedure without notification you may need to reschedule your procedure with another provider.  Our phone number is [phone]      7 DAYS PRIOR TO EXAM:  If you are on PLAVIX, Coumadin or other blood thinners, call your prescribing physician to see if our orders for STOPPING and RESTARTING your ANTICOAGULANTS are acceptable to your PRESCRIBING PHYSICIAN.  Call our office back @ [phone] to notify us if you are unable to stop your anticoagulants.        5 DAYS PRIOR TO YOUR EXAM:  If you are on PLAVIX and your prescribing doctor said you may stop taking it, STOP taking it now. DO NOT restart it until you check with your doctor, after your procedure.  Stop taking NSAIDS, ex:  Ibuprofen, Aleve, or Advil.  Tylenol or other brands which contain Acetaminophen are safe to use prior to this procedure.  Low dose aspirin, 81mg, is okay to continue to take.      3 DAYS PRIOR TO YOUR EXAM:  If you are on Coumadin or other blood thinners, STOP TAKING IT AT THIS TIME.      DAY OF PROCEDURE:  Before your endoscopy, nothing to eat or drink after midnight, except for clear liquids but only until 4 hours prior to scheduled procedure. Clear liquids include water, apple juice, Gatorade, black coffee/tea.  Nothing to eat or drink includes: water, gum, candy, mints, cough drops and chewing tobacco.  You may take blood pressure, seizure, diabetes and breathing medications with a very small sip of water in the morning.      If you have a BIOPSY or POLYPECTOMY, your doctor will advise you AFTER your exam when to restart PLAVIX, blood thinners or aspirin products.  Make certain you have this information BEFORE you go home.                 Patient  Instructions History       62

## 2019-04-08 NOTE — ED ADULT NURSE REASSESSMENT NOTE - NS ED NURSE REASSESS COMMENT FT1
Pt received from HERIBERTO Sandoval in purple. PT AOx3 breathing even unlabored spontaneously, NAD VSS, weakness noted to left upper & lower extremities d/t glioblastoma , awaits MD Dispo.

## 2019-04-08 NOTE — CONSULT NOTE ADULT - SUBJECTIVE AND OBJECTIVE BOX
p ([id number])     HPI:  62yo m pmh GBM on chemo/radiation, Pt was diagnosed in June of last year.  Had tumor resection and was started on chemo and radiation months ago, is only on Avstatin for chemo currently as a means to wean him off of decadron.  Pt is brought in by his wife for a profound fatigue over the past week.  Pt has not gotten out of bed for a week, no focal complaints of pain just does not feel he can walk and get out of bed.  No fevers, no chills.  No new numbness or weakness (pt has left sided neglect after the brain resection.  Spoke with Dr Hess that recommended he come here to be seen and evaluated.  Nothing makes his stated symptoms better or worse.    Exam:  AOx2-3, tongue midline, L field cut  LUE 3/5, LLE 4/5  Otherwise 5/5 throughout    --Anticoagulation:    =====================  PAST MEDICAL HISTORY   Glioblastoma  HLD (hyperlipidemia)  HTN (hypertension)    PAST SURGICAL HISTORY   Status post resection of meningioma  No significant past surgical history    penicillin (Hives)  sulfa drugs (Hives)      MEDICATIONS:  Antibiotics:    Neuro:    Other:      SOCIAL HISTORY:   Occupation:   Marital Status:     FAMILY HISTORY:  No pertinent family history in first degree relatives      ROS: Negative except per HPI    LABS:                          14.6   6.2   )-----------( 106      ( 08 Apr 2019 16:20 )             42.1     04-08    141  |  107  |  23  ----------------------------<  157<H>  3.7   |  21<L>  |  0.68    Ca    9.2      08 Apr 2019 16:20    TPro  5.7<L>  /  Alb  3.6  /  TBili  0.5  /  DBili  x   /  AST  15  /  ALT  33  /  AlkPhos  53  04-08

## 2019-04-08 NOTE — ED PROVIDER NOTE - ATTENDING CONTRIBUTION TO CARE
62yo m pmh GBM on chemo/radiation, Pt was diagnosed in June of last year.  Had tumor resection and was started on chemo and radiation months ago but not currently with worsening weakness, dizziness, with nausea with l sided known weakness due to tumor, not followed here but at [place], ct head, labs, NS consultation. awake and answers questions, no changes in mental status.

## 2019-04-08 NOTE — ED PROVIDER NOTE - OBJECTIVE STATEMENT
64yo m pmh GBM on chemo/radiation, Pt was diagnosed in June of last year.  Had tumor resection and was started on chemo and radiation. 64yo m pmh GBM on chemo/radiation, Pt was diagnosed in June of last year.  Had tumor resection and was started on chemo and radiation months ago, is only on Avstatin for chemo currently as a means to wean him off of decadron.  Pt is brought in by his wife for a profound fatigue over the past week.  Pt has not gotten out of bed for a week, no focal complaints of pain just does not feel he can walk and get out of bed.  No fevers, no chills.  No new numbness or weakness (pt has left sided neglect after the brain resection.  Spoke with Dr Hess that recommended he come here to be seen and evaluated.  Nothing makes his stated symptoms better or worse.

## 2019-04-08 NOTE — H&P ADULT - NSHPPHYSICALEXAM_GEN_ALL_CORE
General: WN/WD NAD  Neurology: A&Ox2-3  Eyes: PERRLA/ EOMI, Gross vision intact  ENT/Neck: Neck supple, trachea midline, No JVD, Gross hearing intact  Respiratory: CTA B/L, No wheezing, rales, rhonchi  CV: RRR, S1S2, no murmurs, rubs or gallops  Abdominal: Soft, NT, ND +BS,   Extremities: No edema, + peripheral pulses  Skin: No Rashes, Hematoma, Ecchymosis

## 2019-04-08 NOTE — ED PROVIDER NOTE - CARE PLAN
Principal Discharge DX:	Urinary tract infection without hematuria, site unspecified  Secondary Diagnosis:	Weakness  Secondary Diagnosis:	Failure to thrive in adult

## 2019-04-08 NOTE — H&P ADULT - ASSESSMENT
62 yo male with a hx of GBM present with worseing weakness and fatigue. found to have a Urinary tract infection which or may not have played a part in worsening weakness

## 2019-04-09 LAB
HCV AB S/CO SERPL IA: 0.05 S/CO — SIGNIFICANT CHANGE UP (ref 0–0.99)
HCV AB SERPL-IMP: SIGNIFICANT CHANGE UP

## 2019-04-09 RX ORDER — ATOVAQUONE 750 MG/5ML
750 SUSPENSION ORAL DAILY
Qty: 0 | Refills: 0 | Status: DISCONTINUED | OUTPATIENT
Start: 2019-04-09 | End: 2019-04-11

## 2019-04-09 RX ORDER — LIDOCAINE 4 G/100G
10 CREAM TOPICAL
Qty: 0 | Refills: 0 | Status: DISCONTINUED | OUTPATIENT
Start: 2019-04-09 | End: 2019-04-11

## 2019-04-09 RX ORDER — DEXAMETHASONE 0.5 MG/5ML
2 ELIXIR ORAL DAILY
Qty: 0 | Refills: 0 | Status: DISCONTINUED | OUTPATIENT
Start: 2019-04-09 | End: 2019-04-11

## 2019-04-09 RX ADMIN — Medication 2 MILLIGRAM(S): at 12:43

## 2019-04-09 RX ADMIN — LOSARTAN POTASSIUM 50 MILLIGRAM(S): 100 TABLET, FILM COATED ORAL at 05:00

## 2019-04-09 RX ADMIN — ATOVAQUONE 750 MILLIGRAM(S): 750 SUSPENSION ORAL at 17:06

## 2019-04-09 RX ADMIN — FAMOTIDINE 20 MILLIGRAM(S): 10 INJECTION INTRAVENOUS at 12:43

## 2019-04-09 RX ADMIN — ESCITALOPRAM OXALATE 5 MILLIGRAM(S): 10 TABLET, FILM COATED ORAL at 12:43

## 2019-04-09 RX ADMIN — Medication 100 MILLIGRAM(S): at 12:43

## 2019-04-09 RX ADMIN — ATORVASTATIN CALCIUM 20 MILLIGRAM(S): 80 TABLET, FILM COATED ORAL at 22:48

## 2019-04-09 RX ADMIN — Medication 2.5 MILLIGRAM(S): at 05:00

## 2019-04-09 RX ADMIN — Medication 20 MILLIGRAM(S): at 12:43

## 2019-04-09 NOTE — PROVIDER CONTACT NOTE (OTHER) - SITUATION
pt has passed dysphagia screening and RN asking for diet order. Also activity order is BR-RN asking for oob with asst, and physical therapy order pt has passed dysphagia screening and RN asking for diet order. Also activity order is BR-RN asking for oob with asst, and physical therapy order  Also, pt with chadwick--on body increased on left

## 2019-04-09 NOTE — PROGRESS NOTE ADULT - SUBJECTIVE AND OBJECTIVE BOX
Patient is a 63y old  Male who presents with a chief complaint of Urinary tract infection, weakness (2019 22:02)        MEDICATIONS  (STANDING):  atorvastatin 20 milliGRAM(s) Oral at bedtime  atovaquone Suspension 750 milliGRAM(s) Oral daily  dexamethasone     Tablet 2.5 milliGRAM(s) Oral daily  dexamethasone     Tablet 2 milliGRAM(s) Oral daily  docusate sodium 100 milliGRAM(s) Oral daily  escitalopram 5 milliGRAM(s) Oral daily  famotidine    Tablet 20 milliGRAM(s) Oral daily  FLUoxetine 20 milliGRAM(s) Oral daily  losartan 50 milliGRAM(s) Oral daily    MEDICATIONS  (PRN):  lidocaine 2% Viscous 10 milliLiter(s) Swish and Spit two times a day PRN mouth care/sore from chemo      Allergies    penicillin (Hives)  sulfa drugs (Hives)            VITALS:   T(C): 36.5 (19 @ 16:54), Max: 36.9 (19 @ 19:20)  HR: 64 (19 @ 16:54) (50 - 65)  BP: 139/85 (19 @ 16:54) (122/73 - 150/92)  RR: 18 (19 @ 16:54) (18 - 20)  SpO2: 95% (19 @ 16:54) (94% - 98%)  Wt(kg): --    PHYSICAL EXAM:  GENERAL: NAD, well nourished and conversant  HEAD:  Atraumatic  EYES: EOM, PERRLA, conjunctiva pink and sclera white  ENT: No tonsillar erythema, exudates, or enlargement, moist mucous membranes, good dentition, no lesions  NECK: Supple, No JVD, normal thyroid, carotids with normal upstrokes and no bruits  CHEST/LUNG: Clear to auscultation bilaterally, No rales, rhonchi, wheezing, or rubs  HEART: Regular rate and rhythm, No murmurs, rubs, or gallops  ABDOMEN: Soft, nondistended, no masses, guarding, tenderness or rebound, bowel sounds present  EXTREMITIES:  2+ Peripheral Pulses, No clubbing, cyanosis, or edema. No arthritis of shoulders, elbows, hands, hips, knees, ankles, or feet. No DJD C spine, T spine, or L/S spine  LYMPH: No lymphadenopathy noted  SKIN: No rashes or lesions  NERVOUS SYSTEM:  Alert & Oriented X3, normal cognitive function. Motor Strength 5/5 right upper and right lower.  5/5 left upper and left lower extremities, DTRs 2+ intact and symmetric    LABS:        CBC Full  -  ( 2019 16:20 )  WBC Count : 6.2 K/uL  RBC Count : 4.14 M/uL  Hemoglobin : 14.6 g/dL  Hematocrit : 42.1 %  Platelet Count - Automated : 106 K/uL  Mean Cell Volume : 102.0 fl  Mean Cell Hemoglobin : 35.2 pg  Mean Cell Hemoglobin Concentration : 34.6 gm/dL  Auto Neutrophil # : 5.5 K/uL  Auto Lymphocyte # : 0.5 K/uL  Auto Monocyte # : 0.2 K/uL  Auto Eosinophil # : 0.0 K/uL  Auto Basophil # : 0.0 K/uL  Auto Neutrophil % : 86.0 %  Auto Lymphocyte % : 9.0 %  Auto Monocyte % : 4.0 %  Auto Eosinophil % : 0.0 %  Auto Basophil % : 0.0 %        141  |  107  |  23  ----------------------------<  157<H>  3.7   |  21<L>  |  0.68    Ca    9.2      2019 16:20    TPro  5.7<L>  /  Alb  3.6  /  TBili  0.5  /  DBili  x   /  AST  15  /  ALT  33  /  AlkPhos  53  04-08    LIVER FUNCTIONS - ( 2019 16:20 )  Alb: 3.6 g/dL / Pro: 5.7 g/dL / ALK PHOS: 53 U/L / ALT: 33 U/L / AST: 15 U/L / GGT: x             Urinalysis Basic - ( 2019 16:20 )    Color: Yellow / Appearance: Slightly Turbid / S.020 / pH: x  Gluc: x / Ketone: Negative  / Bili: Negative / Urobili: Negative   Blood: x / Protein: Trace / Nitrite: Negative   Leuk Esterase: Moderate / RBC: 2 /hpf / WBC 22 /HPF   Sq Epi: x / Non Sq Epi: 0 /hpf / Bacteria: Many      CAPILLARY BLOOD GLUCOSE          RADIOLOGY & ADDITIONAL TESTS:      Consultant(s):    Care Discussed with Consultants/Other Providers [ ] YES  [ ] NO Patient is a 63y old  Male who presents with a chief complaint of Urinary tract infection, weakness.  Pmh GBM on chemo/radiation, Pt was diagnosed in  of last year.  Had tumor resection and was started on chemo and radiation months ago, is only on Avastatin for chemo currently as a means to wean him off of decadron.  Pt is brought in by his wife for a profound fatigue over the past week.  Pt has not gotten out of bed for a week, no focal complaints of pain just does not feel he can walk and get out of bed.  No fevers, no chills.  No new numbness or weakness (pt has left sided neglect after the brain resection.  Spoke with Dr Hess that recommended he come here to be seen and evaluated.  Nothing makes his stated symptoms better or worse. Patient found to have a Urinary tract infection. Patient has a clear weakness of his left upper extremity greater than right and left lower extremity greater than right.  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        MEDICATIONS  (STANDING):  atorvastatin 20 milliGRAM(s) Oral at bedtime  atovaquone Suspension 750 milliGRAM(s) Oral daily  dexamethasone     Tablet 2.5 milliGRAM(s) Oral daily  dexamethasone     Tablet 2 milliGRAM(s) Oral daily  docusate sodium 100 milliGRAM(s) Oral daily  escitalopram 5 milliGRAM(s) Oral daily  famotidine    Tablet 20 milliGRAM(s) Oral daily  FLUoxetine 20 milliGRAM(s) Oral daily  losartan 50 milliGRAM(s) Oral daily    MEDICATIONS  (PRN):  lidocaine 2% Viscous 10 milliLiter(s) Swish and Spit two times a day PRN mouth care/sore from chemo      Allergies    penicillin (Hives)  sulfa drugs (Hives)            VITALS:   T(C): 36.5 (19 @ 16:54), Max: 36.9 (19 @ 19:20)  HR: 64 (19 @ 16:54) (50 - 65)  BP: 139/85 (19 @ 16:54) (122/73 - 150/92)  RR: 18 (19 @ 16:54) (18 - 20)  SpO2: 95% (19 @ 16:54) (94% - 98%)  Wt(kg): --    PHYSICAL EXAM:  GENERAL: NAD, well nourished and conversant  HEAD:  Atraumatic  EYES: EOM, PERRLA, conjunctiva pink and sclera white  ENT: No tonsillar erythema, exudates, or enlargement, moist mucous membranes, good dentition, no lesions  NECK: Supple, No JVD, normal thyroid, carotids with normal upstrokes and no bruits  CHEST/LUNG: Clear to auscultation bilaterally, No rales, rhonchi, wheezing, or rubs  HEART: Regular rate and rhythm, No murmurs, rubs, or gallops  ABDOMEN: Soft, nondistended, no masses, guarding, tenderness or rebound, bowel sounds present  EXTREMITIES:  2+ Peripheral Pulses, No clubbing, cyanosis, or edema. No arthritis of shoulders, elbows, hands, hips, knees, ankles, or feet. No DJD C spine, T spine, or L/S spine  LYMPH: No lymphadenopathy noted  SKIN: No rashes or lesions  NERVOUS SYSTEM:  Alert & Oriented X3, normal cognitive function. Motor Strength 5/5 right upper and right lower.  5/5 left upper and left lower extremities, DTRs 2+ intact and symmetric    LABS:        CBC Full  -  ( 2019 16:20 )  WBC Count : 6.2 K/uL  RBC Count : 4.14 M/uL  Hemoglobin : 14.6 g/dL  Hematocrit : 42.1 %  Platelet Count - Automated : 106 K/uL  Mean Cell Volume : 102.0 fl  Mean Cell Hemoglobin : 35.2 pg  Mean Cell Hemoglobin Concentration : 34.6 gm/dL  Auto Neutrophil # : 5.5 K/uL  Auto Lymphocyte # : 0.5 K/uL  Auto Monocyte # : 0.2 K/uL  Auto Eosinophil # : 0.0 K/uL  Auto Basophil # : 0.0 K/uL  Auto Neutrophil % : 86.0 %  Auto Lymphocyte % : 9.0 %  Auto Monocyte % : 4.0 %  Auto Eosinophil % : 0.0 %  Auto Basophil % : 0.0 %        141  |  107  |  23  ----------------------------<  157<H>  3.7   |  21<L>  |  0.68    Ca    9.2      2019 16:20    TPro  5.7<L>  /  Alb  3.6  /  TBili  0.5  /  DBili  x   /  AST  15  /  ALT  33  /  AlkPhos  53  04-08    LIVER FUNCTIONS - ( 2019 16:20 )  Alb: 3.6 g/dL / Pro: 5.7 g/dL / ALK PHOS: 53 U/L / ALT: 33 U/L / AST: 15 U/L / GGT: x             Urinalysis Basic - ( 2019 16:20 )    Color: Yellow / Appearance: Slightly Turbid / S.020 / pH: x  Gluc: x / Ketone: Negative  / Bili: Negative / Urobili: Negative   Blood: x / Protein: Trace / Nitrite: Negative   Leuk Esterase: Moderate / RBC: 2 /hpf / WBC 22 /HPF   Sq Epi: x / Non Sq Epi: 0 /hpf / Bacteria: Many      CAPILLARY BLOOD GLUCOSE          RADIOLOGY & ADDITIONAL TESTS:      Consultant(s):    Care Discussed with Consultants/Other Providers [ ] YES  [ ] NO

## 2019-04-09 NOTE — PROVIDER CONTACT NOTE (OTHER) - ACTION/TREATMENT ORDERED:
NP Aldo aware of above, to order diet, change activity and order PT. NP Carlson aware of above, to order diet, change activity and order PT. petichea to be monitored

## 2019-04-10 DIAGNOSIS — R42 DIZZINESS AND GIDDINESS: ICD-10-CM

## 2019-04-10 RX ORDER — DIAZEPAM 5 MG
2.5 TABLET ORAL ONCE
Qty: 0 | Refills: 0 | Status: DISCONTINUED | OUTPATIENT
Start: 2019-04-10 | End: 2019-04-10

## 2019-04-10 RX ORDER — NYSTATIN 500MM UNIT
500000 POWDER (EA) MISCELLANEOUS EVERY 8 HOURS
Qty: 0 | Refills: 0 | Status: DISCONTINUED | OUTPATIENT
Start: 2019-04-10 | End: 2019-04-10

## 2019-04-10 RX ORDER — MECLIZINE HCL 12.5 MG
25 TABLET ORAL ONCE
Qty: 0 | Refills: 0 | Status: COMPLETED | OUTPATIENT
Start: 2019-04-10 | End: 2019-04-10

## 2019-04-10 RX ORDER — DIPHENHYDRAMINE HYDROCHLORIDE AND LIDOCAINE HYDROCHLORIDE AND ALUMINUM HYDROXIDE AND MAGNESIUM HYDRO
5 KIT EVERY 8 HOURS
Qty: 0 | Refills: 0 | Status: DISCONTINUED | OUTPATIENT
Start: 2019-04-10 | End: 2019-04-11

## 2019-04-10 RX ADMIN — LOSARTAN POTASSIUM 50 MILLIGRAM(S): 100 TABLET, FILM COATED ORAL at 05:44

## 2019-04-10 RX ADMIN — Medication 25 MILLIGRAM(S): at 09:59

## 2019-04-10 RX ADMIN — Medication 10 MILLIGRAM(S): at 14:28

## 2019-04-10 RX ADMIN — Medication 2 MILLIGRAM(S): at 13:08

## 2019-04-10 RX ADMIN — Medication 2.5 MILLIGRAM(S): at 17:58

## 2019-04-10 RX ADMIN — DIPHENHYDRAMINE HYDROCHLORIDE AND LIDOCAINE HYDROCHLORIDE AND ALUMINUM HYDROXIDE AND MAGNESIUM HYDRO 5 MILLILITER(S): KIT at 21:28

## 2019-04-10 RX ADMIN — ATORVASTATIN CALCIUM 20 MILLIGRAM(S): 80 TABLET, FILM COATED ORAL at 21:27

## 2019-04-10 RX ADMIN — ESCITALOPRAM OXALATE 5 MILLIGRAM(S): 10 TABLET, FILM COATED ORAL at 13:08

## 2019-04-10 RX ADMIN — ATOVAQUONE 750 MILLIGRAM(S): 750 SUSPENSION ORAL at 13:09

## 2019-04-10 RX ADMIN — Medication 100 MILLIGRAM(S): at 13:08

## 2019-04-10 RX ADMIN — Medication 20 MILLIGRAM(S): at 13:08

## 2019-04-10 RX ADMIN — Medication 2.5 MILLIGRAM(S): at 05:44

## 2019-04-10 RX ADMIN — FAMOTIDINE 20 MILLIGRAM(S): 10 INJECTION INTRAVENOUS at 13:08

## 2019-04-10 NOTE — PHYSICAL THERAPY INITIAL EVALUATION ADULT - ADDITIONAL COMMENTS
Pt states he lives with his wife in a house with 1 steps to enter and 12-15 steps inside, +HR. Pt states he requires assistance for all functional mobility and ADLs. Pt uses a RW for ambulation. Pt's aide and wife assists pt when needed.

## 2019-04-10 NOTE — PHYSICAL THERAPY INITIAL EVALUATION ADULT - TRANSFER TRAINING, PT EVAL
GOAL: Patient will perform all transfers min Ax1, in 2 weeks. GOAL: Patient will perform all transfers CGAx1, in 2 weeks.

## 2019-04-10 NOTE — PHYSICAL THERAPY INITIAL EVALUATION ADULT - BED MOBILITY TRAINING, PT EVAL
GOAL: Patient will perform bed mobility min Ax1, in 2 weeks. GOAL: Patient will perform bed mobility CGAx1, in 2 weeks.

## 2019-04-10 NOTE — PHYSICAL THERAPY INITIAL EVALUATION ADULT - GAIT TRAINING, PT EVAL
GOAL: Patient will ambulate 150 feet with appropriate assistive device as needed with min Ax1, in 2 weeks. GOAL: Patient will ambulate 150 feet with appropriate assistive device as needed with CGAx1, in 2 weeks.

## 2019-04-10 NOTE — PHYSICAL THERAPY INITIAL EVALUATION ADULT - PERTINENT HX OF CURRENT PROBLEM, REHAB EVAL
Pt is a 63 y.o. male with Pt is a 63 y.o. male with  pmhx GBM s/p multiple resections and bx for recurrence / radiation necrosis and MSK. Now follows with Dr. Hess at [place]. Has been tapering steroids and recently started avastin, presents for failure to thrive over several weeks. (-) CXR 4/8/19. Continued below.

## 2019-04-10 NOTE — PROGRESS NOTE ADULT - SUBJECTIVE AND OBJECTIVE BOX
I had a lengthy discussion with the patient and his wife. Discharge is pending EEG and the study done when asymptomatic, is likely to be non diagnostic, given the presence of a GBM. If seizure is suspected as the etiology, 24 hr EEG monitoring would be more diagnostic. At present, he is taking prophylactic Keppra; raising his seizure threshold and lessening his risk. Etiology of syncope is not established. Long history of vasovagal syncope adds chronicity to the problem. History that syncope was preceded by nausea nausea, change in body positions and syncope occurred during a cold shower, is most suggestive of  a vasovagal event. The patient has had no arrythmia on telemetry, no orthostatic hypotension in hospital and is presently fully ambulatory and asymptomatic. He can be safely discharged to home today and have follow up  with Dr Rock to arrange 24 hr EEG and or EPS consultation and follow up. Hemodynamically stable for discharge to home today.  Full instruction provided to patient and wife regarding diagnosis, treatment, discharge medications, diet and follow up care. Medically stable and for discharge to home today as planned.      MEDICATIONS  (STANDING):  atorvastatin 20 milliGRAM(s) Oral at bedtime  atovaquone Suspension 750 milliGRAM(s) Oral daily  dexamethasone     Tablet 2.5 milliGRAM(s) Oral daily  dexamethasone     Tablet 2 milliGRAM(s) Oral daily  docusate sodium 100 milliGRAM(s) Oral daily  escitalopram 5 milliGRAM(s) Oral daily  famotidine    Tablet 20 milliGRAM(s) Oral daily  FIRST- Mouthwash  BLM 5 milliLiter(s) Swish and Spit every 8 hours  FLUoxetine 20 milliGRAM(s) Oral daily  losartan 50 milliGRAM(s) Oral daily    MEDICATIONS  (PRN):  lidocaine 2% Viscous 10 milliLiter(s) Swish and Spit two times a day PRN mouth care/sore from chemo    REVIEW OF SYSTEMS:  	CONSTITUTIONAL: weight loss  	HEAD: + headache and dizziness  	EYES: decreased vision  	ENT:  No difficulty hearing, tinnitus, vertigo, No sinus or throat pain  	NECK: No pain or stiffness  	BREASTS: No pain, masses, or nipple discharge  	RESPIRATORY: No cough, wheezing, chills or hemoptysis, No shortness of breath at rest or exertional shortness of breath  	CARDIOVASCULAR: No chest pain, palpitations, dizziness, CHF, arrhythmia, cardiomegaly or leg swelling  	GASTROINTESTINAL: constipation  	GENITOURINARY: No dysuria, frequency, hematuria, or incontinence  	SKIN: No itching, burning, rashes, or lesions   	LYMPH NODES: No history of enlarged glands  	ENDOCRINE: No heat or cold intolerance, No hair loss. No osteoporosis or thyroid disease  	MUSCULOSKELETAL: + leg pain  	PSYCHIATRIC: depressed and confused  	HEME/LYMPH: No easy bruising, anticoagulants, bleeding disorder or bleeding gums  	ALLERGY AND IMMUNOLOGIC: No hives or eczema  NEUROLOGICAL: + memory loss, loss of strength      VITALS:   T(C): 36.7 (04-10-19 @ 20:21), Max: 36.7 (04-10-19 @ 05:31)  HR: 61 (04-10-19 @ 20:21) (54 - 88)  BP: 123/73 (04-10-19 @ 20:21) (117/75 - 133/88)  RR: 18 (04-10-19 @ 20:21) (17 - 18)  SpO2: 96% (04-10-19 @ 20:21) (96% - 98%)  Wt(kg): --      Physical exam  General: WN/WD NAD  Neurology: A&Ox3, nonfocal, LAZARO x 4  Eyes: PERRLA/ EOMI, Gross vision intact  ENT/Neck: Neck supple, trachea midline, No JVD, Gross hearing intact  Respiratory: CTA B/L, No wheezing, rales, rhonchi  CV: RRR, S1S2, no murmurs, rubs or gallops  Abdominal: Soft, NT, ND +BS,   Extremities: No edema, + peripheral pulses  Skin: No Rashes, Hematoma, Ecchymosis      LABS:    None                  CAPILLARY BLOOD GLUCOSE          RADIOLOGY & ADDITIONAL TESTS:

## 2019-04-10 NOTE — PHYSICAL THERAPY INITIAL EVALUATION ADULT - PRECAUTIONS/LIMITATIONS, REHAB EVAL
fall precautions fall precautions/Head CT 4/8/19: Redemonstration of right parietotemporal craniotomy. Interval right parietal craniotomy. Residual and partially calcified, predominantly isodense neoplasm along the posterior right lateral convexity is poorly delineated. Significantly decreased vasogenic edema in the right cerebral hemisphere. Midline shift has resolved. Right temporal horn is mildly, asymmetrically dilated, suggesting entrapment.

## 2019-04-10 NOTE — PHYSICAL THERAPY INITIAL EVALUATION ADULT - IMPAIRMENTS CONTRIBUTING TO GAIT DEVIATIONS, PT EVAL
decreased flexibility/narrow base of support/impaired balance/decreased strength/impaired coordination

## 2019-04-10 NOTE — PHYSICAL THERAPY INITIAL EVALUATION ADULT - PLANNED THERAPY INTERVENTIONS, PT EVAL
balance training/bed mobility training/GOAL: Stair Negotiation Training: Patient will be able to negotiate up & down 1 flight of stairs with bilateral rails, step to gait pattern, in 2 weeks./gait training/strengthening/transfer training

## 2019-04-11 ENCOUNTER — TRANSCRIPTION ENCOUNTER (OUTPATIENT)
Age: 64
End: 2019-04-11

## 2019-04-11 VITALS
HEART RATE: 74 BPM | OXYGEN SATURATION: 95 % | SYSTOLIC BLOOD PRESSURE: 146 MMHG | TEMPERATURE: 98 F | RESPIRATION RATE: 18 BRPM | DIASTOLIC BLOOD PRESSURE: 86 MMHG

## 2019-04-11 PROCEDURE — 71045 X-RAY EXAM CHEST 1 VIEW: CPT

## 2019-04-11 PROCEDURE — 70450 CT HEAD/BRAIN W/O DYE: CPT

## 2019-04-11 PROCEDURE — 97162 PT EVAL MOD COMPLEX 30 MIN: CPT

## 2019-04-11 PROCEDURE — 85027 COMPLETE CBC AUTOMATED: CPT

## 2019-04-11 PROCEDURE — 99285 EMERGENCY DEPT VISIT HI MDM: CPT | Mod: 25

## 2019-04-11 PROCEDURE — 86803 HEPATITIS C AB TEST: CPT

## 2019-04-11 PROCEDURE — 96375 TX/PRO/DX INJ NEW DRUG ADDON: CPT

## 2019-04-11 PROCEDURE — 96361 HYDRATE IV INFUSION ADD-ON: CPT

## 2019-04-11 PROCEDURE — 80053 COMPREHEN METABOLIC PANEL: CPT

## 2019-04-11 PROCEDURE — 87086 URINE CULTURE/COLONY COUNT: CPT

## 2019-04-11 PROCEDURE — 96374 THER/PROPH/DIAG INJ IV PUSH: CPT

## 2019-04-11 PROCEDURE — 81001 URINALYSIS AUTO W/SCOPE: CPT

## 2019-04-11 PROCEDURE — 93005 ELECTROCARDIOGRAM TRACING: CPT

## 2019-04-11 PROCEDURE — 87186 SC STD MICRODIL/AGAR DIL: CPT

## 2019-04-11 RX ORDER — DIAZEPAM 5 MG
2.5 TABLET ORAL EVERY 6 HOURS
Qty: 0 | Refills: 0 | Status: DISCONTINUED | OUTPATIENT
Start: 2019-04-11 | End: 2019-04-11

## 2019-04-11 RX ORDER — BENZOCAINE AND MENTHOL 5; 1 G/100ML; G/100ML
1 LIQUID ORAL
Qty: 0 | Refills: 0 | COMMUNITY
Start: 2019-04-11

## 2019-04-11 RX ORDER — DIPHENHYDRAMINE HYDROCHLORIDE AND LIDOCAINE HYDROCHLORIDE AND ALUMINUM HYDROXIDE AND MAGNESIUM HYDRO
5 KIT
Qty: 0 | Refills: 0 | COMMUNITY
Start: 2019-04-11

## 2019-04-11 RX ORDER — POLYETHYLENE GLYCOL 3350 17 G/17G
17 POWDER, FOR SOLUTION ORAL
Qty: 0 | Refills: 0 | COMMUNITY
Start: 2019-04-11

## 2019-04-11 RX ORDER — ATORVASTATIN CALCIUM 80 MG/1
1 TABLET, FILM COATED ORAL
Qty: 0 | Refills: 0 | COMMUNITY
Start: 2019-04-11

## 2019-04-11 RX ORDER — LIDOCAINE 4 G/100G
10 CREAM TOPICAL
Qty: 0 | Refills: 0 | COMMUNITY
Start: 2019-04-11

## 2019-04-11 RX ORDER — FLUOXETINE HCL 10 MG
1 CAPSULE ORAL
Qty: 0 | Refills: 0 | COMMUNITY
Start: 2019-04-11 | End: 2019-04-13

## 2019-04-11 RX ORDER — CIPROFLOXACIN LACTATE 400MG/40ML
500 VIAL (ML) INTRAVENOUS EVERY 12 HOURS
Qty: 0 | Refills: 0 | Status: DISCONTINUED | OUTPATIENT
Start: 2019-04-11 | End: 2019-04-11

## 2019-04-11 RX ORDER — POLYETHYLENE GLYCOL 3350 17 G/17G
17 POWDER, FOR SOLUTION ORAL DAILY
Qty: 0 | Refills: 0 | Status: DISCONTINUED | OUTPATIENT
Start: 2019-04-11 | End: 2019-04-11

## 2019-04-11 RX ORDER — DEXAMETHASONE 0.5 MG/5ML
5 ELIXIR ORAL
Qty: 0 | Refills: 0 | COMMUNITY
Start: 2019-04-11

## 2019-04-11 RX ORDER — DOCUSATE SODIUM 100 MG
1 CAPSULE ORAL
Qty: 0 | Refills: 0 | COMMUNITY
Start: 2019-04-11

## 2019-04-11 RX ORDER — ESCITALOPRAM OXALATE 10 MG/1
1 TABLET, FILM COATED ORAL
Qty: 0 | Refills: 0 | COMMUNITY
Start: 2019-04-11

## 2019-04-11 RX ORDER — LOSARTAN POTASSIUM 100 MG/1
1 TABLET, FILM COATED ORAL
Qty: 0 | Refills: 0 | COMMUNITY
Start: 2019-04-11

## 2019-04-11 RX ORDER — DIAZEPAM 5 MG
2.5 TABLET ORAL
Qty: 0 | Refills: 0 | COMMUNITY
Start: 2019-04-11

## 2019-04-11 RX ORDER — SENNA PLUS 8.6 MG/1
2 TABLET ORAL AT BEDTIME
Qty: 0 | Refills: 0 | Status: DISCONTINUED | OUTPATIENT
Start: 2019-04-11 | End: 2019-04-11

## 2019-04-11 RX ORDER — FAMOTIDINE 10 MG/ML
1 INJECTION INTRAVENOUS
Qty: 0 | Refills: 0 | COMMUNITY
Start: 2019-04-11

## 2019-04-11 RX ORDER — ATOVAQUONE 750 MG/5ML
5 SUSPENSION ORAL
Qty: 0 | Refills: 0 | COMMUNITY
Start: 2019-04-11

## 2019-04-11 RX ORDER — ATOVAQUONE 750 MG/5ML
5 SUSPENSION ORAL
Qty: 0 | Refills: 0 | COMMUNITY

## 2019-04-11 RX ORDER — BENZOCAINE AND MENTHOL 5; 1 G/100ML; G/100ML
1 LIQUID ORAL EVERY 8 HOURS
Qty: 0 | Refills: 0 | Status: DISCONTINUED | OUTPATIENT
Start: 2019-04-11 | End: 2019-04-11

## 2019-04-11 RX ORDER — FLUOXETINE HCL 10 MG
10 CAPSULE ORAL DAILY
Qty: 0 | Refills: 0 | Status: DISCONTINUED | OUTPATIENT
Start: 2019-04-11 | End: 2019-04-11

## 2019-04-11 RX ORDER — SENNA PLUS 8.6 MG/1
2 TABLET ORAL
Qty: 0 | Refills: 0 | COMMUNITY
Start: 2019-04-11

## 2019-04-11 RX ORDER — DEXAMETHASONE 0.5 MG/5ML
1 ELIXIR ORAL
Qty: 0 | Refills: 0 | COMMUNITY
Start: 2019-04-11

## 2019-04-11 RX ORDER — CIPROFLOXACIN LACTATE 400MG/40ML
1 VIAL (ML) INTRAVENOUS
Qty: 0 | Refills: 0 | COMMUNITY
Start: 2019-04-11 | End: 2019-04-18

## 2019-04-11 RX ADMIN — BENZOCAINE AND MENTHOL 1 LOZENGE: 5; 1 LIQUID ORAL at 12:32

## 2019-04-11 RX ADMIN — POLYETHYLENE GLYCOL 3350 17 GRAM(S): 17 POWDER, FOR SOLUTION ORAL at 12:31

## 2019-04-11 RX ADMIN — Medication 2.5 MILLIGRAM(S): at 17:02

## 2019-04-11 RX ADMIN — LIDOCAINE 10 MILLILITER(S): 4 CREAM TOPICAL at 12:31

## 2019-04-11 RX ADMIN — ATOVAQUONE 750 MILLIGRAM(S): 750 SUSPENSION ORAL at 12:31

## 2019-04-11 RX ADMIN — FAMOTIDINE 20 MILLIGRAM(S): 10 INJECTION INTRAVENOUS at 12:32

## 2019-04-11 RX ADMIN — ESCITALOPRAM OXALATE 5 MILLIGRAM(S): 10 TABLET, FILM COATED ORAL at 12:31

## 2019-04-11 RX ADMIN — Medication 2.5 MILLIGRAM(S): at 10:45

## 2019-04-11 RX ADMIN — Medication 2 MILLIGRAM(S): at 12:32

## 2019-04-11 RX ADMIN — LOSARTAN POTASSIUM 50 MILLIGRAM(S): 100 TABLET, FILM COATED ORAL at 06:08

## 2019-04-11 RX ADMIN — Medication 100 MILLIGRAM(S): at 12:32

## 2019-04-11 RX ADMIN — DIPHENHYDRAMINE HYDROCHLORIDE AND LIDOCAINE HYDROCHLORIDE AND ALUMINUM HYDROXIDE AND MAGNESIUM HYDRO 5 MILLILITER(S): KIT at 06:08

## 2019-04-11 RX ADMIN — Medication 500 MILLIGRAM(S): at 17:02

## 2019-04-11 RX ADMIN — Medication 20 MILLIGRAM(S): at 12:31

## 2019-04-11 RX ADMIN — Medication 2.5 MILLIGRAM(S): at 06:08

## 2019-04-11 NOTE — DISCHARGE NOTE NURSING/CASE MANAGEMENT/SOCIAL WORK - NSDCDPATPORTLINK_GEN_ALL_CORE
You can access the [facility] Patient Portal, offered by [facility], by registering with the following website: [email or web address]

## 2019-04-11 NOTE — PROGRESS NOTE ADULT - REASON FOR ADMISSION
Urinary tract infection, weakness

## 2019-04-11 NOTE — PROGRESS NOTE ADULT - ATTENDING COMMENTS
On IV antibiotics for urosepsis with improved mental status and response time.  It appears that his urosepsis has contributed to his malignant brain tumor, resulting in increased metabolic encephalopathy.  He is improving rapidly, but has residual left hemiparesis.  At the conclusion of this hospitalization, he will require rehabilitation, in order to regain the ability to ambulate.
DC planning to rehab
DC planning to rehab

## 2019-04-11 NOTE — DISCHARGE NOTE PROVIDER - HOSPITAL COURSE
62 yo male with a hx of GBM present with worseing weakness and fatigue. found to have a Urinary tract infection which or may not have played a part in worsening weakness    Glioblastoma.  Plan: continue follow up as out pt with neuro/oncology    will work on DC planning to rehab.     Urinary tract infection without hematuria, site unspecified.  Plan: will follow cultures and start abx as needed    cultures positive for E coli sensitive for cipro    will start cipro 500 BID.     Weakness.  Plan: start physical therapy with plan to go to inpatient rehab.     Failure to thrive in adult.  Plan: encourage PO and participation.     HTN (hypertension).  Plan: currently controlled    continue current meds     adjust as needed.     Vertigo. Plan: Patient has failed meclazine treatment    valium has helped with dizziness will contine.    Attending Attestation:     DC planning to rehab.

## 2019-04-11 NOTE — PROGRESS NOTE ADULT - SUBJECTIVE AND OBJECTIVE BOX
62yo m pmh GBM on chemo/radiation, Pt was diagnosed in June of last year.  Had tumor resection and was started on chemo and radiation months ago, is only on Avastatin for chemo currently as a means to wean him off of decadron.  Pt is brought in by his wife for a profound fatigue over the past week.  Pt has not gotten out of bed for a week, no focal complaints of pain just does not feel he can walk and get out of bed.  No fevers, no chills.  No new numbness or weakness (pt has left sided neglect after the brain resection.    Nothing makes his stated symptoms better or worse. Patient found to have a Urinary tract infection.     MEDICATIONS  (STANDING):  atorvastatin 20 milliGRAM(s) Oral at bedtime  atovaquone Suspension 750 milliGRAM(s) Oral daily  benzocaine 15 mG/menthol 3.6 mG Lozenge 1 Lozenge Oral every 8 hours  ciprofloxacin     Tablet 500 milliGRAM(s) Oral every 12 hours  dexamethasone     Tablet 2 milliGRAM(s) Oral daily  dexamethasone     Tablet 2.5 milliGRAM(s) Oral daily  docusate sodium 100 milliGRAM(s) Oral daily  escitalopram 5 milliGRAM(s) Oral daily  famotidine    Tablet 20 milliGRAM(s) Oral daily  FIRST- Mouthwash  BLM 5 milliLiter(s) Swish and Spit every 8 hours  FLUoxetine 10 milliGRAM(s) Oral daily  losartan 50 milliGRAM(s) Oral daily  polyethylene glycol 3350 17 Gram(s) Oral daily  senna 2 Tablet(s) Oral at bedtime    MEDICATIONS  (PRN):  diazepam    Tablet 2.5 milliGRAM(s) Oral every 6 hours PRN Dizziness  lidocaine 2% Viscous 10 milliLiter(s) Swish and Spit two times a day PRN mouth care/sore from chemo          VITALS:   T(C): 36.6 (04-11-19 @ 13:01), Max: 36.7 (04-10-19 @ 20:21)  HR: 74 (04-11-19 @ 13:01) (59 - 74)  BP: 146/86 (04-11-19 @ 13:01) (113/77 - 146/86)  RR: 18 (04-11-19 @ 13:01) (17 - 18)  SpO2: 95% (04-11-19 @ 13:01) (95% - 96%)  Wt(kg): --    Physical exam  General: WN/WD NAD  Neurology: left sided neglect   Eyes: PERRLA/ EOMI, Gross vision intact  ENT/Neck: Neck supple, trachea midline, No JVD, Gross hearing intact  Respiratory: CTA B/L, No wheezing, rales, rhonchi  CV: RRR, S1S2, no murmurs, rubs or gallops  Abdominal: Soft, NT, ND +BS,   Extremities: No edema, + peripheral pulses  Skin: No Rashes, Hematoma, Ecchymosis    LABS:                      CAPILLARY BLOOD GLUCOSE          RADIOLOGY & ADDITIONAL TESTS:

## 2019-04-11 NOTE — DISCHARGE NOTE PROVIDER - NSDCFUADDINST_GEN_ALL_CORE_FT
VERY  IMPORTANT MEDICATION INSTRUCTIONS AS FOLLOWS:    PROZAC INSTRUCTIONS:  1 cap(s) orally once a day X 2 DAYS.  DISCONTINUE AFTER 2 DAYS.  LAST DOSE ON 4/13.     LEXAPRO INSTRUCTIONS:  1 tab(s) orally once a day.  INCREASE DOSE TO 10 MG QD - FIRST DOSE ON 4/14

## 2019-04-11 NOTE — PROGRESS NOTE ADULT - PROBLEM SELECTOR PLAN 2
will follow cultures and start abx as needed  cultures positive for E coli sensitive for cipro  will start cipro 500 BID

## 2019-04-11 NOTE — DISCHARGE NOTE PROVIDER - NSDCCPCAREPLAN_GEN_ALL_CORE_FT
PRINCIPAL DISCHARGE DIAGNOSIS  Diagnosis: Urinary tract infection without hematuria, site unspecified  Assessment and Plan of Treatment: HOME CARE INSTRUCTIONS  You were prescribed antibiotics, take them exactly as your caregiver instructs you. Finish the medication even if you feel better after you have only taken some of the medication.  Drink enough water and fluids to keep your urine clear or pale yellow.  Avoid caffeine, tea, and carbonated beverages. They tend to irritate your bladder.  Empty your bladder often. Avoid holding urine for long periods of time.  Empty your bladder before and after sexual intercourse.  After a bowel movement, women should cleanse from front to back. Use each tissue only once.  SEEK MEDICAL CARE IF:  You have back pain.  You develop a fever.  Your symptoms do not begin to resolve within 3 days.  SEEK IMMEDIATE MEDICAL CARE IF:  You have severe back pain or lower abdominal pain.  You develop chills.  You have nausea or vomiting.  You have continued burning or discomfort with urination.        SECONDARY DISCHARGE DIAGNOSES  Diagnosis: Vertigo  Assessment and Plan of Treatment: Continue Valium Q6h prn.  Fall Precautions    Diagnosis: Glioblastoma  Assessment and Plan of Treatment: Follow up with your Oncologist within 1 week after discharge for management and to restart  your antineoplastic medicaiton.    Diagnosis: HTN (hypertension)  Assessment and Plan of Treatment: Take your medication as prescribed.  Follow up with your medical doctor for routine blood pressure monitoring, and to establish long term blood pressure treatment goals.  Low salt diet  Activity as tolerated.  Notify your doctor if you have any of the following symptoms:   Dizziness, Lightheadedness, Blurry vision, Headache, Chest pain, Shortness of breath      Diagnosis: Failure to thrive in adult  Assessment and Plan of Treatment: Frequent small meals    Diagnosis: Weakness  Assessment and Plan of Treatment: Physical Therapy as tolerated.

## 2019-04-11 NOTE — PROGRESS NOTE ADULT - ASSESSMENT
64 yo male with a hx of GBM present with worseing weakness and fatigue. found to have a Urinary tract infection which or may not have played a part in worsening weakness
64 yo male with a hx of GBM present with worseing weakness and fatigue. found to have a Urinary tract infection which or may not have played a part in worsening weakness
Patient has a clear weakness of his left upper extremity greater than right and left lower extremity greater than right.  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

## 2019-04-11 NOTE — DISCHARGE NOTE PROVIDER - CARE PROVIDER_API CALL
Nate Rock)  Gastroenterology; Internal Medicine  [address] [place], [place] [address]  [place] [address]  Phone: [phone]  Fax: [phone]  Follow Up Time: Nate Rock)  Gastroenterology; Internal Medicine  [address] [place] [address], [address]  [place] [address]  Phone: [phone]  Fax: [phone]  Follow Up Time:     Pierre Hess  [address] [facility] [address] [place] [address]  Phone: [phone]  Fax: (   )    -  Follow Up Time:

## 2019-04-11 NOTE — DISCHARGE NOTE PROVIDER - PROVIDER TOKENS
PROVIDER:[TOKEN:[[id number]]] PROVIDER:[TOKEN:[[id number]]],FREE:[LAST:[Fine],FIRST:[Pierre],PHONE:[phone]],FAX:[(   )    -],ADDRESS:[[address] [place] [address]]]

## 2020-06-11 NOTE — ED PROVIDER NOTE - CONSTITUTIONAL NEGATIVE STATEMENT, MLM
Hgb 10.2. hx of leukemia  stable w/o active bleeding  c/w iron tab and folic acid no fever and no chills.

## 2022-11-02 NOTE — ED ADULT NURSE NOTE - NSSISCREENINGQ4_ED_A_ED
Spoke to patient's  Guillaume. Guillaume stated that the patient was unavailable and would call the office back for scheduling. Next injection is due on or after 12/2/2022.   No

## 2023-04-17 NOTE — DISCHARGE NOTE PROVIDER - NSCORESITESY/N_GEN_A_CORE_RD
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    Fulll results listed below    < from: CT Head No Cont (04.17.23 @ 15:52) >    INTERPRETATION:  CT head without IV contrast    CLINICAL INFORMATION: Acute mental status    TECHNIQUE: Contiguous axial 5 mm sections were obtained through the head.   Sagittal and coronal 2-D reformatted images were also obtained.   This   scan was performed using automatic exposure control (radiation dose   reduction software) to obtain a diagnostic image quality scan with   patient dose as low as reasonably achievable.    FINDINGS:   No previous examinations are available for review.    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    No mass effect is found in the   brain.    The ventricles, sulci and basal cisterns appear unremarkable.    The orbits are unremarkable.  The paranasal sinuses are clear.  The nasal   cavity appears intact.  The nasopharynx is symmetric.  The central skull   base, petrous temporal bones and calvarium remain intact.      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    Critical value:  I discussed the finding of this report with REYMUNDO Philip   at 4:15 PM on 04/17/2023.  Criticalvalue policy of the hospital was   followed.  Read back and confirmation of receipt of this communication   was performed.  This verbal communication supplements the text report of   this document.    < end of copied text >    Plan:   D/w Dr. Valdes.   House neuro consult called, pending further recommendations.   Family updated of consult at bedside.     Dulce Philip PA-C No

## 2023-04-27 NOTE — DISCHARGE NOTE PROVIDER - CARE PROVIDERS DIRECT ADDRESSES
,DirectAddress_Unknown ,DirectAddress_Unknown,DirectAddress_Unknown There are no Wet Read(s) to document.
